# Patient Record
Sex: FEMALE | Race: WHITE | NOT HISPANIC OR LATINO | Employment: STUDENT | URBAN - METROPOLITAN AREA
[De-identification: names, ages, dates, MRNs, and addresses within clinical notes are randomized per-mention and may not be internally consistent; named-entity substitution may affect disease eponyms.]

---

## 2017-01-03 ENCOUNTER — GENERIC CONVERSION - ENCOUNTER (OUTPATIENT)
Dept: OTHER | Facility: OTHER | Age: 22
End: 2017-01-03

## 2017-01-16 ENCOUNTER — GENERIC CONVERSION - ENCOUNTER (OUTPATIENT)
Dept: OTHER | Facility: OTHER | Age: 22
End: 2017-01-16

## 2017-01-16 ENCOUNTER — ALLSCRIPTS OFFICE VISIT (OUTPATIENT)
Dept: OTHER | Facility: OTHER | Age: 22
End: 2017-01-16

## 2017-01-16 DIAGNOSIS — N64.4 MASTODYNIA: ICD-10-CM

## 2017-01-20 ENCOUNTER — APPOINTMENT (OUTPATIENT)
Dept: ULTRASOUND IMAGING | Facility: CLINIC | Age: 22
End: 2017-01-20
Payer: COMMERCIAL

## 2017-01-20 ENCOUNTER — HOSPITAL ENCOUNTER (OUTPATIENT)
Dept: ULTRASOUND IMAGING | Facility: CLINIC | Age: 22
Discharge: HOME | End: 2017-01-20
Payer: COMMERCIAL

## 2017-01-20 DIAGNOSIS — N64.4 MASTODYNIA: ICD-10-CM

## 2017-01-20 PROCEDURE — 76642 ULTRASOUND BREAST LIMITED: CPT

## 2018-01-12 NOTE — MISCELLANEOUS
Message   Date: 03 Jan 2017 2:25 PM EST, Recorded By: Francis Verde For: Ermelinda Double: Olena Luciano, Self   Phone: [phone] (Home)   Reason: Medical Complaint   Patient called c/o bilateral breast pain x a few days, more toward axilla  LMP 12/19/16  Advised schedule appointment  Active Problems    1  Breast self examination education, encounter for (V65 49) (Z71 89)   2  Contraceptive surveillance (V25 40) (Z30 40)   3  Dysmenorrhea (625 3) (N94 6)   4  Encounter for initial management of nuvaring (V25 02) (Z30 49)   5  Encounter for routine pelvic examination (V72 31) (Z01 419)   6  Folliculitis (425 3) (J64 5)   7  History of self breast exam   8  Knee pain (719 46) (M25 569)   9  Oral contraceptive use (V25 41) (Z30 41)   10  Ovarian cyst (620 2) (N83 20)   11  Pelvic and perineal pain (625 9) (R10 2)   12  Stress incontinence, female (625 6) (N39 3)    Current Meds   1  Aviane 0 1-20 MG-MCG Oral Tablet; TAKE 1 TABLET DAILY AS DIRECTED; Therapy: 45VPS8204 to (Evaluate:28Oct2016)  Requested for: 30XPC1612; Last   Rx:13Oct2016 Ordered   2  Caltrate 600+D TABS; Therapy: (Recorded:04Owv3307) to Recorded   3  Ibuprofen 400 MG Oral Tablet; Therapy: (Recorded:66Bfq9280) to Recorded   4  Multi-Vitamin Daily Oral Tablet; Therapy: (Recorded:84Mix5602) to Recorded   5  NexIUM PACK; Therapy: (Recorded:39Xcq1907) to Recorded   6  Pepcid AC TABS; Therapy: (Recorded:29Snn1848) to Recorded    Allergies    1   No Known Drug Allergies    Signatures   Electronically signed by : Horacio Schreiber, ; Jr  3 2017  2:28PM EST                       (Author)

## 2018-01-16 NOTE — MISCELLANEOUS
Message   Recorded as Task   Date: 05/18/2016 01:31 PM, Created By: Janene Fuller   Task Name: Follow Up   Assigned To: Arcelia Thompson   Regarding Patient: Mychal Winslow, Status: In Progress   Comment:    Karissa Campbell - 18 May 2016 1:31 PM     TASK CREATED  pt is on microgestin and is still having irregular cycles and is getting headaches    she was on nuva ring before and it was changed because of the headaches, but her cycle was regular then  Nataliia Seymour any ideas? Kristy Hernandez - 18 May 2016 8:48 PM     TASK REPLIED TO: Previously Assigned To Kristy Hernandez  she should probably come in for discussion   Janene Fuller - 19 May 2016 7:21 AM     TASK REPLIED TO: Previously Assigned To Karissa Campbell  the problem is that you are booking into September   Rin Mesa - 20 May 2016 2:05 PM     TASK REPLIED TO: Previously Assigned To Kristy Hernandez  have her try aviane and then RTO 3 months for pill check, if HA continue she should call,   Karissa Campbell - 23 May 2016 7:19 AM     TASK REASSIGNED: Previously Assigned To Brianna Castro - 23 May 2016 1:24 PM     TASK IN PROGRESS    Patient informed  Escript sent  Will schedule pill check appt  Active Problems    1  Breast self examination education, encounter for (V65 49) (Z71 89)   2  Contraceptive surveillance (V25 40) (Z30 40)   3  Dysmenorrhea (625 3) (N94 6)   4  Encounter for initial management of nuvaring (V25 02) (Z30 49)   5  Encounter for routine pelvic examination (V72 31) (Z01 419)   6  Folliculitis (547 6) (W54 0)   7  History of self breast exam   8  Knee pain (719 46) (M25 569)   9  Ovarian cyst (620 2) (N83 20)   10  Pelvic and perineal pain (625 9) (R10 2)   11  Stress incontinence, female (625 6) (N39 3)    Current Meds   1  Caltrate 600+D TABS; Therapy: (Recorded:24Nwo3998) to Recorded   2  Ibuprofen 400 MG Oral Tablet; Therapy: (Recorded:52Sfr7747) to Recorded   3   Microgestin FE 1/20 1-20 MG-MCG Oral Tablet; TAKE 1 TABLET DAILY; Therapy: 36EYA2359 to (Evaluate:20Jan2016)  Requested for: 34EFA9283; Last   Rx:08Jan2016 Ordered   4  Multi-Vitamin Daily Oral Tablet; Therapy: (Recorded:16Plp0884) to Recorded    Allergies    1   No Known Drug Allergies    Signatures   Electronically signed by : Dann Spurling, ; May 23 2016  4:14PM EST                       (Author)

## 2018-01-22 VITALS
BODY MASS INDEX: 20.02 KG/M2 | SYSTOLIC BLOOD PRESSURE: 100 MMHG | DIASTOLIC BLOOD PRESSURE: 72 MMHG | WEIGHT: 113 LBS | HEIGHT: 63 IN

## 2018-01-23 NOTE — PROGRESS NOTES
Assessment    1  Breast pain, left (611 71) (N64 4)   2  Breast pain, right (611 71) (N64 4)    Plan  Breast pain, left, Breast pain, right    · *US BREAST LEFT LIMITED (DIAGNOSTIC); Status:Hold For - Scheduling; Requested  ZGL:[id number];    Perform:[facility]; IDP:[id number]; Ordered; For:Breast pain, left, Breast pain, right; Ordered By:Yanni Church;   · *US BREAST RIGHT LIMITED (DIAGNOSTIC); Status:Hold For - Scheduling; Requested  JJI:[id number];    Perform:[facility]; IBI:[id number]; Ordered; For:Breast pain, left, Breast pain, right; Ordered By:Yanni Church; Discussion/Summary  Discussion Summary:   Breast pain most likely related to fibrocystic breast disease  I advised pt to take NSAIDS, wear loose fitting bras, decrease caffein intake and possibly use supplement like evening primrose oil and vitamin E   Breast US was ordered for patient reassurance, no single nodule was palpated, we will call pt with results  Counseling Documentation With Imm: The patient was counseled regarding instructions for management, prognosis, patient and family education, risks and benefits of treatment options  total time of encounter was 25 minutes and 15 minutes was spent counseling  Chief Complaint  Chief Complaint Free Text Note Form: PATIENT IS HERE FOR PROBLEM VISIT, PATIENT C/O BILATERAL BREAST PAIN      History of Present Illness  HPI: 24 y/o nulliparous F here for problem visit  She is currently on oral contraception  She has been experiencing breast pain on the outer edges of her breast for a few weeks  This has not gone away and is not related to her cycles or change in contraception  Her LMP was 12/18/16  Menarche was at the age of 17  She denies nipple discharge, breast masses  She denies a family h/o breast CA  Review of Systems  Focused-Female:   Constitutional: No fever, no chills, feels well, no tiredness, no recent weight gain or loss     ENT: no ear ache, no loss of hearing, no nosebleeds or nasal discharge, no sore throat or hoarseness  Cardiovascular: no complaints of slow or fast heart rate, no chest pain, no palpitations, no leg claudication or lower extremity edema  Respiratory: no complaints of shortness of breath, no wheezing, no dyspnea on exertion, no orthopnea or PND  Breasts: as noted in HPI  Gastrointestinal: no complaints of abdominal pain, no constipation, no nausea or diarrhea, no vomiting, no bloody stools  Genitourinary: no complaints of dysuria, no incontinence, no pelvic pain, no dysmenorrhea, no vaginal discharge or abnormal vaginal bleeding  Musculoskeletal: no complaints of arthralgia, no myalgia, no joint swelling or stiffness, no limb pain or swelling  Integumentary: no complaints of skin rash or lesion, no itching or dry skin, no skin wounds  Neurological: no complaints of headache, no confusion, no numbness or tingling, no dizziness or fainting  ROS Reviewed:   ROS reviewed  Active Problems    1  Breast self examination education, encounter for ( 49) (Z71 89)   2  Contraceptive surveillance (V25 40) (Z30 40)   3  Dysmenorrhea (625 3) (N94 6)   4  Encounter for initial management of nuvaring (V25 02) (Z30 49)   5  Encounter for routine pelvic examination (V72 31) (Z01 419)   6  Folliculitis (651 5) (R82 0)   7  History of self breast exam   8  Knee pain (719 46) (M25 569)   9  Oral contraceptive use (V25 41) (Z30 41)   10  Ovarian cyst (620 2) (N83 20)   11  Pelvic and perineal pain (625 9) (R10 2)   12  Stress incontinence, female (625 6) (N39 3)    Past Medical History    1  Breast self examination education, encounter for ( 49) (Z71 89)   2  History of Denial Of Any Significant Medical History   3  History of  0 (V49 89) (Z78 9)   4  History of self breast exam  Active Problems And Past Medical History Reviewed: The active problems and past medical history were reviewed and updated today        Surgical History 1  History of Knee Surgery   2  History of Oral Surgery Tooth Extraction  Surgical History Reviewed: The surgical history was reviewed and updated today  Family History  Mother    1  No pertinent family history  Father    2  Family history of diabetes mellitus (V18 0) (Z83 3)  Paternal [de-identified] Sister    3  Family history of diabetes mellitus (V18 0) (Z83 3)  Paternal Great Grandmother    4  Family history of diabetes mellitus (V18 0) (Z83 3)  Paternal Uncle    5  Family history of diabetes mellitus (V18 0) (Z83 3)   6  Family history of hepatic cirrhosis (V18 59) (Z83 79)  Family History    7  Family history of Diabetes Mellitus (V18 0)  Family History Reviewed: The family history was reviewed and updated today  Social History    · Denied: History of Alcohol Use (History)   · Caffeine Use   · Condom   · Denied: History of Drug use   · Denied: History of    · Marital History - Single   · Never A Smoker   · Oral contraceptive use (V25 41) (Z30 41)   · Religion Affiliation None   · Uses Safety Equipment - Seatbelts  Social History Reviewed: The social history was reviewed and updated today  The social history was reviewed and is unchanged  Current Meds   1  Aviane 0 1-20 MG-MCG Oral Tablet; TAKE 1 TABLET DAILY AS DIRECTED; Therapy:  to (Evaluate:2016)  Requested for: 96QNM7213; Last   Rx:2016 Ordered   2  Caltrate 600+D TABS; Therapy: (Recorded:69Ggl2603) to Recorded   3  Ibuprofen 400 MG Oral Tablet; Therapy: (Recorded:19Gkr5468) to Recorded   4  Multi-Vitamin Daily Oral Tablet; Therapy: (Recorded:74Ndr9765) to Recorded   5  NexIUM PACK; Therapy: (Recorded:92Yeb4735) to Recorded   6  Pepcid AC TABS; Therapy: (Recorded:93Joi9306) to Recorded    Allergies    1   No Known Drug Allergies    Vitals  Vital Signs    Recorded: 80PDT6612 42:29KM   Systolic 014, RUE, Sitting   Diastolic 72, RUE, Sitting   Height 5 ft 2 5 in   Weight 113 lb    BMI Calculated 20 34   BSA Calculated 1 51   BMI Percentile 33 %   2-20 Stature Percentile 24 %   2-20 Weight Percentile 21 %   LMP 54XAP2317     Physical Exam    Chest   Breasts: Abnormal   normal appearance  Examination of the nipples revealed normal appearance and no discharge  Right Breast:  fibrocystic changes  A mass was palpated in the superior medial quadrant  Left Breast: mulitple nodules were palpated   fibrocystic changes  A mass was palpated in the superior lateral quadrant         Signatures   Electronically signed by : LAURO Santos ; Jan 16 2017  2:13PM EST                       (Author)

## 2018-01-30 DIAGNOSIS — Z30.41 ENCOUNTER FOR SURVEILLANCE OF CONTRACEPTIVE PILLS: Primary | ICD-10-CM

## 2018-02-02 ENCOUNTER — TELEPHONE (OUTPATIENT)
Dept: OBGYN CLINIC | Facility: CLINIC | Age: 23
End: 2018-02-02

## 2018-02-02 DIAGNOSIS — Z30.41 ENCOUNTER FOR SURVEILLANCE OF CONTRACEPTIVE PILLS: Primary | ICD-10-CM

## 2018-02-02 RX ORDER — LEVONORGESTREL AND ETHINYL ESTRADIOL 0.1-0.02MG
1 KIT ORAL DAILY
Qty: 28 TABLET | Refills: 2 | Status: SHIPPED | OUTPATIENT
Start: 2018-02-02 | End: 2018-07-11

## 2018-02-09 RX ORDER — IBUPROFEN 400 MG/1
TABLET ORAL ONCE AS NEEDED
COMMUNITY

## 2018-02-09 RX ORDER — LEVONORGESTREL AND ETHINYL ESTRADIOL 0.1-0.02MG
KIT ORAL
Qty: 28 TABLET | Refills: 5 | Status: SHIPPED | OUTPATIENT
Start: 2018-02-09 | End: 2018-07-11 | Stop reason: SDUPTHER

## 2018-02-09 RX ORDER — FAMOTIDINE 10 MG
TABLET ORAL
COMMUNITY
End: 2018-07-11

## 2018-05-15 ENCOUNTER — EVALUATION (OUTPATIENT)
Dept: PHYSICAL THERAPY | Facility: MEDICAL CENTER | Age: 23
End: 2018-05-15
Payer: COMMERCIAL

## 2018-05-15 DIAGNOSIS — M76.891 HIP FLEXOR TENDONITIS, RIGHT: Primary | ICD-10-CM

## 2018-05-15 DIAGNOSIS — M53.3 SACROILIAC DYSFUNCTION: ICD-10-CM

## 2018-05-15 PROCEDURE — G8990 OTHER PT/OT CURRENT STATUS: HCPCS | Performed by: PHYSICAL THERAPIST

## 2018-05-15 PROCEDURE — 97162 PT EVAL MOD COMPLEX 30 MIN: CPT | Performed by: PHYSICAL THERAPIST

## 2018-05-15 PROCEDURE — G8991 OTHER PT/OT GOAL STATUS: HCPCS | Performed by: PHYSICAL THERAPIST

## 2018-05-15 NOTE — PROGRESS NOTES
Evaluation     Today's date: 5/15/2018  Patient name: Pastor Valerio  : 1995  MRN: [id number]  Referring provider: Renuka Lisa  Dx:   Encounter Diagnosis     ICD-10-CM    1  Hip flexor tendonitis, right M76 891    2  Sacroiliac dysfunction M53 3                   Assessment  Impairments: abnormal coordination, abnormal muscle firing, abnormal muscle tone, abnormal or restricted ROM, abnormal movement, activity intolerance, impaired physical strength, lacks appropriate home exercise program and pain with function    Assessment details: Arturo Oscar is a pleasant 24 y/o female who presents with complaints of right posterior and anterior hip pain with difficulty performing gymnastics and higher level activities  Patient notes that her pain is worse at times when she is active, laughing, coughing etc  Patient's symptoms are consistent with SIJ upslip and a right hip flexor tendonitis   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  She has been doing better overall since having an injection in her anterior hip  No further referral appears necessary at this time based upon examination results      Understanding of Dx/Px/POC: good   Prognosis: good  Prognosis details: Positive prognostic indicators include positive attitude toward recovery        Goals  Impairment Goals  - Pt I with initial HEP in 1-2 visits  - Improve ROM equal to contralateral side in 4-6 weeks  - Increase strength to 5/5 in all affected areas in 4-6 week    Functional Goals  - Increase Functional Status Measure measured by FOTO by [facility]  - Patient will be independent with comprehensive HEP in 6-8 weeks  - Return to ADL's as desired     Plan  Patient would benefit from: skilled PT  Referral necessary: No  Planned modality interventions: thermotherapy: hydrocollator packs  Planned therapy interventions: abdominal trunk stabilization, activity modification, joint mobilization, manual therapy, motor coordination training, neuromuscular re-education, patient education, self care, therapeutic activities, therapeutic exercise, home exercise program, behavior modification, postural training, transfer training, IADL retraining and graded activity  Treatment plan discussed with: patient  Plan details: Prognosis above is given PT services 2x/week tapering to 1x/week over the next 2 months and home program adherence          Subjective Evaluation     History of Present Illness  Mechanism of injury: Patient states that she doesn't know when her pain started however she has been having severe anterior hip pain over the past several weeks to the point where she couldn't walk  Patient notes that her hip has been feeling better since injection but her lower back and posterior hip pain persists  Patient states that her back pain is worse with increased activity and 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  Pain  Current pain rating: 3  At best pain ratin  At worst pain ratin  Quality: throbbing     Treatments  Previous treatment: chiropractic         Objective      Special Questions        Additional Special Questions  No red flags present  Lower quarter screen unremarkable     Static Posture       Postural Observations  Seated posture: normal  Standing posture: normal        Palpation   Left   Hypertonic in the erector spinae and quadratus lumborum  Hypotonic in the transverse abdominus       Right   Hypertonic in the erector spinae and quadratus lumborum  Hypotonic in the transverse abdominus     Pain at anterior hip flexor and surrounding ASIS  Discomfort at PSIS      Neurological Testing      Sensation      Normal   No signs of radiculopathy or neuropathology     Reflexes   Left   Patellar (L4): normal (2+)  Achilles (S1): normal (2+)  Babinski sign: negative  Clonus sign: negative     Right   Patellar (L4): normal (2+)  Achilles (S1): normal (2+)  Babinski sign: negative  Clonus sign: negative     Active Range of Motion      Lumbar   Flexion: WFL and with pain  Extension: WFL and with pain  Left lateral flexion: WFL  Right lateral flexion: WFL  Left rotation: WFL  Right rotation: WFL  Left Knee   Normal active range of motion     Right Knee   Normal active range of motion    Left Ankle/Foot   Normal active range of motion     Right Ankle/Foot   Normal active range of motion     Passive Range of Motion   Left Hip   Internal rotation (prone): 50 degrees      Right Hip   Internal rotation (prone): 50 degrees      Strength/Myotome Testing      Lumbar   Left   Heel walk: normal  Toe walk: normal     Right   Heel walk: normal  Toe walk: normal     right Hip   Planes of Motion   Flexion: WFL  Extension: 3+  Abduction: 3+  Adduction: WFL  External rotation: 3+     left Hip   Planes of Motion   Flexion: WFL  Extension: 5  Abduction: 5  Adduction: WFL  External rotation:5     Left Knee   Flexion: WFL  Extension: WFL     Right Knee   Flexion: WFL  Extension: WFL     Left Ankle/Foot   Dorsiflexion: WFL  Plantar flexion: WFL  Inversion: WFL  Eversion: WFL  Great toe extension: WFL     Right Ankle/Foot   Dorsiflexion: WFL  Plantar flexion: WFL  Inversion: WFL  Eversion: WFL  Great toe extension: WFL     Muscle Activation     Difficulty with activation of right hip flexors due to pain   Patient demonstrates poor activation of Transversus Abdominus and multifidus force couple and loss of feed forward mechanism with reaching tasks    Patient was able to perform activation with cueing        Tests      Lumbar   Positive prone instability   Negative repeated flexion and repeated extension       Left   Negative crossed SLR, [place] and passive SLR       Right   Negative crossed SLR, [place] and passive SLR        Left Pelvic Girdle/Sacrum   Negative: sacrum compression, sacral spring and thigh thrust       Right Pelvic Girdle/Sacrum   Positive:  sacrum compression, sacral spring and thigh thrust, SI compression and SI distraction       Left Hip   Negative DENNY PINEDO Gaenslen's, samson, SI compression and SI distraction     SLR: Negative       Right Hip   Negative DENNY PINEDO Gaenslen's, scour,  SLR: Negative       Ambulation   Weight-Bearing Status   Weight-Bearing Status (Left): full weight bearing   Weight-Bearing Status (Right): full weight-bearing       Observational Gait   Gait: within functional limits      Functional Assessment   Squat   Pain       Single Leg Stance   Left: 10 (EO & EC) seconds  Right: 10 (EO & EC) seconds        Precautions: none

## 2018-05-15 NOTE — LETTER
May 16, 2018    Dyllan Ramirez MD  [address] [place] [id number]    Patient: Romina Shearer   YOB: 1995   Date of Visit: 5/15/2018     Encounter Diagnosis     ICD-10-CM    1  Hip flexor tendonitis, right M76 891    2  Sacroiliac dysfunction M53 3        Dear Dr Margaux Holm:    Please review the attached Plan of Care from [facility] recent visit  Please verify that you agree therapy should continue by signing the attached document and sending it back to our office  If you have any questions or concerns, please don't hesitate to call  Sincerely,    Joseph Bartlett PT      Referring Provider:      I certify that I have read the below Plan of Care and certify the need for these services furnished under this plan of treatment while under my care  Dyllan Ramirez MD  [address] [place] [address]: [phone]          Evaluation     Today's date: 5/15/2018  Patient name: Romina Shearer  : 1995  MRN: [id number]  Referring provider: Adelfo London*  Dx:   Encounter Diagnosis     ICD-10-CM    1  Hip flexor tendonitis, right M76 891    2  Sacroiliac dysfunction M53 3                   Assessment  Impairments: abnormal coordination, abnormal muscle firing, abnormal muscle tone, abnormal or restricted ROM, abnormal movement, activity intolerance, impaired physical strength, lacks appropriate home exercise program and pain with function    Assessment details: Ean Patterson is a pleasant 22 y/o female who presents with complaints of right posterior and anterior hip pain with difficulty performing gymnastics and higher level activities  Patient notes that her pain is worse at times when she is active, laughing, coughing etc  Patient's symptoms are consistent with SIJ upslip and a right hip flexor tendonitis   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    She has been doing better overall since having an injection in her anterior hip  No further referral appears necessary at this time based upon examination results  Understanding of Dx/Px/POC: good   Prognosis: good  Prognosis details: Positive prognostic indicators include positive attitude toward recovery        Goals  Impairment Goals  - Pt I with initial HEP in 1-2 visits  - Improve ROM equal to contralateral side in 4-6 weeks  - Increase strength to 5/5 in all affected areas in 4-6 week    Functional Goals  - Increase Functional Status Measure measured by TO by [facility]  - Patient will be independent with comprehensive HEP in 6-8 weeks  - Return to ADL's as desired     Plan  Patient would benefit from: skilled PT  Referral necessary: No  Planned modality interventions: thermotherapy: hydrocollator packs  Planned therapy interventions: abdominal trunk stabilization, activity modification, joint mobilization, manual therapy, motor coordination training, neuromuscular re-education, patient education, self care, therapeutic activities, therapeutic exercise, home exercise program, behavior modification, postural training, transfer training, IADL retraining and graded activity  Treatment plan discussed with: patient  Plan details: Prognosis above is given PT services 2x/week tapering to 1x/week over the next 2 months and home program adherence          Subjective Evaluation     History of Present Illness  Mechanism of injury: Patient states that she doesn't know when her pain started however she has been having severe anterior hip pain over the past several weeks to the point where she couldn't walk  Patient notes that her hip has been feeling better since injection but her lower back and posterior hip pain persists  Patient states that her back pain is worse with increased activity and 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       Pain  Current pain rating: 3  At best pain ratin  At worst pain ratin  Quality: throbbing     Treatments  Previous treatment: chiropractic         Objective      Special Questions        Additional Special Questions  No red flags present  Lower quarter screen unremarkable     Static Posture       Postural Observations  Seated posture: normal  Standing posture: normal        Palpation   Left   Hypertonic in the erector spinae and quadratus lumborum  Hypotonic in the transverse abdominus       Right   Hypertonic in the erector spinae and quadratus lumborum  Hypotonic in the transverse abdominus     Pain at anterior hip flexor and surrounding ASIS  Discomfort at PSIS      Neurological Testing      Sensation      Normal   No signs of radiculopathy or neuropathology     Reflexes   Left   Patellar (L4): normal (2+)  Achilles (S1): normal (2+)  Babinski sign: negative  Clonus sign: negative     Right   Patellar (L4): normal (2+)  Achilles (S1): normal (2+)  Babinski sign: negative  Clonus sign: negative     Active Range of Motion      Lumbar   Flexion: WFL and with pain  Extension: WFL and with pain  Left lateral flexion: WFL  Right lateral flexion: WFL  Left rotation: WFL  Right rotation: WFL  Left Knee   Normal active range of motion     Right Knee   Normal active range of motion    Left Ankle/Foot   Normal active range of motion     Right Ankle/Foot   Normal active range of motion     Passive Range of Motion   Left Hip   Internal rotation (prone): 50 degrees      Right Hip   Internal rotation (prone): 50 degrees      Strength/Myotome Testing      Lumbar   Left   Heel walk: normal  Toe walk: normal     Right   Heel walk: normal  Toe walk: normal     right Hip   Planes of Motion   Flexion: WFL  Extension: 3+  Abduction: 3+  Adduction: WFL  External rotation: 3+     left Hip   Planes of Motion   Flexion: WFL  Extension: 5  Abduction: 5  Adduction: WFL  External rotation:5     Left Knee   Flexion: WFL  Extension: WFL     Right Knee   Flexion: WFL  Extension: WFL     Left Ankle/Foot   Dorsiflexion: WFL  Plantar flexion: WFL  Inversion: WFL  Eversion: WFL  Great toe extension: WFL     Right Ankle/Foot   Dorsiflexion: WFL  Plantar flexion: WFL  Inversion: WFL  Eversion: WFL  Great toe extension: WFL     Muscle Activation     Difficulty with activation of right hip flexors due to pain   Patient demonstrates poor activation of Transversus Abdominus and multifidus force couple and loss of feed forward mechanism with reaching tasks    Patient was able to perform activation with cueing        Tests      Lumbar   Positive prone instability   Negative repeated flexion and repeated extension       Left   Negative crossed SLR, [place] and passive SLR       Right   Negative crossed SLR, [place] and passive SLR     Left Pelvic Girdle/Sacrum   Negative: sacrum compression, sacral spring and thigh thrust       Right Pelvic Girdle/Sacrum   Positive:  sacrum compression, sacral spring and thigh thrust, SI compression and SI distraction       Left Hip   Negative KHRIS, FADIR, Gaenslen's, scour, SI compression and SI distraction     SLR: Negative       Right Hip   Negative KHRIS, FADIR, Gaenslen's, scour,  SLR: Negative       Ambulation   Weight-Bearing Status   Weight-Bearing Status (Left): full weight bearing   Weight-Bearing Status (Right): full weight-bearing       Observational Gait   Gait: within functional limits      Functional Assessment   Squat   Pain       Single Leg Stance   Left: 10 (EO & EC) seconds  Right: 10 (EO & EC) seconds        Precautions: none

## 2018-05-16 ENCOUNTER — TRANSCRIBE ORDERS (OUTPATIENT)
Dept: PHYSICAL THERAPY | Facility: MEDICAL CENTER | Age: 23
End: 2018-05-16

## 2018-05-16 DIAGNOSIS — M76.891 HIP FLEXOR TENDONITIS, RIGHT: Primary | ICD-10-CM

## 2018-05-18 ENCOUNTER — OFFICE VISIT (OUTPATIENT)
Dept: PHYSICAL THERAPY | Facility: MEDICAL CENTER | Age: 23
End: 2018-05-18
Payer: COMMERCIAL

## 2018-05-18 DIAGNOSIS — M53.3 SACROILIAC DYSFUNCTION: Primary | ICD-10-CM

## 2018-05-18 PROCEDURE — 97110 THERAPEUTIC EXERCISES: CPT | Performed by: PHYSICAL THERAPIST

## 2018-05-18 PROCEDURE — 97140 MANUAL THERAPY 1/> REGIONS: CPT | Performed by: PHYSICAL THERAPIST

## 2018-05-18 PROCEDURE — 97010 HOT OR COLD PACKS THERAPY: CPT | Performed by: PHYSICAL THERAPIST

## 2018-05-18 NOTE — PROGRESS NOTES
Daily Note     Today's date: 2018  Patient name: Parker Casiano  : 1995  MRN: [id number]  Referring provider: Graeme Elder*  Dx:   Encounter Diagnosis     ICD-10-CM    1  Sacroiliac dysfunction M53 3                   Subjective: patient states that she is feeling ok today having more pain following last session but better following that  Patient would like to return to activity as desired  Objective: See treatment diary below  Precautions: none    Daily Treatment Diary     Manual              Lumbar supine mob Gr  v            Long leg distraction 2min                                                       Exercise Diary              bike 10min            Hip flexor stretch 10x3            Quad stretch 10x3            Side lying hip abduction 10x3            Bridge with ball 10x3            clamshell 10x3            Standing hip extension 10x3                                                                                                                                                                                         Modalities                                                               Assessment: Tolerated treatment well  Patient demonstrated fatigue post treatment, exhibited good technique with therapeutic exercises and would benefit from continued PT      Plan: Continue per plan of care

## 2018-05-22 ENCOUNTER — OFFICE VISIT (OUTPATIENT)
Dept: PHYSICAL THERAPY | Facility: MEDICAL CENTER | Age: 23
End: 2018-05-22
Payer: COMMERCIAL

## 2018-05-22 DIAGNOSIS — M76.891 HIP FLEXOR TENDONITIS, RIGHT: ICD-10-CM

## 2018-05-22 DIAGNOSIS — M53.3 SACROILIAC DYSFUNCTION: Primary | ICD-10-CM

## 2018-05-22 PROCEDURE — 97140 MANUAL THERAPY 1/> REGIONS: CPT | Performed by: PHYSICAL THERAPIST

## 2018-05-22 PROCEDURE — 97112 NEUROMUSCULAR REEDUCATION: CPT | Performed by: PHYSICAL THERAPIST

## 2018-05-22 PROCEDURE — 97110 THERAPEUTIC EXERCISES: CPT | Performed by: PHYSICAL THERAPIST

## 2018-05-22 NOTE — PROGRESS NOTES
Daily Note     Today's date: 2018  Patient name: Silviano Hammonds  : 1995  MRN: [id number]  Referring provider: Valarie Fink  Dx:   Encounter Diagnosis     ICD-10-CM    1  Sacroiliac dysfunction M53 3    2  Hip flexor tendonitis, right M76 891                   Subjective: patient states that she is feeling ok today having a lot of cracking and popping in the front of her hip  Objective: See treatment diary below  Precautions: none    Daily Treatment Diary     Manual              Lumbar supine mob Gr  v            Long leg distraction 2min                                                       Exercise Diary              bike 10min            Hip flexor stretch 10x3            Quad stretch 10x3            Side lying hip abduction 10x3            Bridge with ball 10x3            clamshell 10x3            Standing hip extension 10x3                                                                                                                                                                                         Modalities                                                               Assessment: Tolerated treatment well  Patient demonstrated fatigue post treatment, exhibited good technique with therapeutic exercises and would benefit from continued PT  Patient was progressed with TE as tolerated without increasing her symptoms  Plan: Continue per plan of care

## 2018-05-25 ENCOUNTER — APPOINTMENT (OUTPATIENT)
Dept: PHYSICAL THERAPY | Facility: MEDICAL CENTER | Age: 23
End: 2018-05-25
Payer: COMMERCIAL

## 2018-05-29 ENCOUNTER — OFFICE VISIT (OUTPATIENT)
Dept: PHYSICAL THERAPY | Facility: MEDICAL CENTER | Age: 23
End: 2018-05-29
Payer: COMMERCIAL

## 2018-05-29 DIAGNOSIS — M76.891 HIP FLEXOR TENDONITIS, RIGHT: ICD-10-CM

## 2018-05-29 DIAGNOSIS — M53.3 SACROILIAC DYSFUNCTION: Primary | ICD-10-CM

## 2018-05-29 PROCEDURE — 97110 THERAPEUTIC EXERCISES: CPT | Performed by: PHYSICAL THERAPIST

## 2018-05-29 PROCEDURE — 97140 MANUAL THERAPY 1/> REGIONS: CPT | Performed by: PHYSICAL THERAPIST

## 2018-05-29 NOTE — PROGRESS NOTES
Daily Note     Today's date: 2018  Patient name: Tramaine Morelos  : 1995  MRN: [id number]  Referring provider: Roya Chavarria  Dx:   Encounter Diagnosis     ICD-10-CM    1  Sacroiliac dysfunction M53 3    2  Hip flexor tendonitis, right M76 891                   Subjective: patient states that she is feeling good having less pain overall  Hip is feeling better however continues to crack  Lower back is painful going from flexion to neutral        Objective: See treatment diary below  Precautions: none    Daily Treatment Diary     Manual              Lumbar supine mob Gr  v            Long leg distraction 2min                                                       Exercise Diary              bike 10min            Hip flexor stretch off table 10x3            Quad stretch 10x3            Side lying hip abduction 10x3            Bridge with ball 10x3            clamshell 10x3            Standing hip extension 10x3            Piriformis stretch 10x3                                                                                                                                                                            Modalities                                                               Assessment: Tolerated treatment well  Patient demonstrated fatigue post treatment, exhibited good technique with therapeutic exercises and would benefit from continued PT  Patient was progressed with TE as tolerated without increasing her symptoms  Patient was kinesio taped at the end of session  Plan: Continue per plan of care

## 2018-06-01 ENCOUNTER — OFFICE VISIT (OUTPATIENT)
Dept: PHYSICAL THERAPY | Facility: MEDICAL CENTER | Age: 23
End: 2018-06-01
Payer: COMMERCIAL

## 2018-06-01 DIAGNOSIS — M53.3 SACROILIAC DYSFUNCTION: Primary | ICD-10-CM

## 2018-06-01 DIAGNOSIS — M76.891 HIP FLEXOR TENDONITIS, RIGHT: ICD-10-CM

## 2018-06-01 PROCEDURE — 97140 MANUAL THERAPY 1/> REGIONS: CPT | Performed by: PHYSICAL THERAPIST

## 2018-06-01 PROCEDURE — 97110 THERAPEUTIC EXERCISES: CPT | Performed by: PHYSICAL THERAPIST

## 2018-06-01 NOTE — PROGRESS NOTES
Daily Note     Today's date: 2018  Patient name: Tyler Ayala  : 1995  MRN: [id number]  Referring provider: Atilio Lyman  Dx:   Encounter Diagnosis     ICD-10-CM    1  Sacroiliac dysfunction M53 3    2  Hip flexor tendonitis, right M76 891                   Subjective: patient states that she is feeling good having less pain overall  Hip is feeling better however continues to crack  Lower back is painful going from flexion to neutral        Objective: See treatment diary below  Precautions: none    Daily Treatment Diary     Manual              Lumbar supine mob Gr  v            Long leg distraction 2min                                                       Exercise Diary              bike 10min            Hip flexor stretch off table 10x3            Quad stretch 10x3            Side lying hip abduction 10x3            Bridge with ball 10x3            clamshell 10x3            Standing hip extension 10x3            Piriformis stretch 10x3            Modified plank [id number]                                                                                                                                                               Modalities                                                               Assessment: Tolerated treatment well  Patient demonstrated fatigue post treatment, exhibited good technique with therapeutic exercises and would benefit from continued PT  Patient was progressed with TE as tolerated without increasing her symptoms  Patient was kinesio taped at the end of session  Plan: Continue per plan of care

## 2018-06-04 ENCOUNTER — OFFICE VISIT (OUTPATIENT)
Dept: PHYSICAL THERAPY | Facility: MEDICAL CENTER | Age: 23
End: 2018-06-04
Payer: COMMERCIAL

## 2018-06-04 DIAGNOSIS — M76.891 HIP FLEXOR TENDONITIS, RIGHT: ICD-10-CM

## 2018-06-04 DIAGNOSIS — M53.3 SACROILIAC DYSFUNCTION: Primary | ICD-10-CM

## 2018-06-04 PROCEDURE — 97140 MANUAL THERAPY 1/> REGIONS: CPT | Performed by: PHYSICAL THERAPIST

## 2018-06-04 PROCEDURE — 97110 THERAPEUTIC EXERCISES: CPT | Performed by: PHYSICAL THERAPIST

## 2018-06-04 NOTE — PROGRESS NOTES
Daily Note     Today's date: 2018  Patient name: Panfilo Goldberg  : 1995  MRN: [id number]  Referring provider: Gardenia Sunshine*  Dx:   Encounter Diagnosis     ICD-10-CM    1  Sacroiliac dysfunction M53 3    2  Hip flexor tendonitis, right M76 891                   Subjective: patient states that she is feeling good having less pain overall  Hip is feeling better however continues to crack  Lower back is painful going from flexion to neutral        Objective: See treatment diary below  Precautions: none    Daily Treatment Diary     Manual              Lumbar supine mob Gr  v            Long leg distraction 2min                                                       Exercise Diary              bike 10min            Hip flexor stretch off table 10x3            Quad stretch 10x3            Side lying hip abduction 10x3            Bridge with ball 10x3            clamshell 10x3            Standing hip extension 10x3            Piriformis stretch 10x3            Modified plank [id number]                                                                                                                                                               Modalities                                                               Assessment: Tolerated treatment well  Patient demonstrated fatigue post treatment, exhibited good technique with therapeutic exercises and would benefit from continued PT  Patient was progressed with TE as tolerated without increasing her symptoms  Patient was kinesio taped at the end of session  Plan: Continue per plan of care

## 2018-06-08 ENCOUNTER — APPOINTMENT (OUTPATIENT)
Dept: PHYSICAL THERAPY | Facility: MEDICAL CENTER | Age: 23
End: 2018-06-08
Payer: COMMERCIAL

## 2018-06-11 ENCOUNTER — APPOINTMENT (OUTPATIENT)
Dept: PHYSICAL THERAPY | Facility: MEDICAL CENTER | Age: 23
End: 2018-06-11
Payer: COMMERCIAL

## 2018-06-15 ENCOUNTER — OFFICE VISIT (OUTPATIENT)
Dept: PHYSICAL THERAPY | Facility: MEDICAL CENTER | Age: 23
End: 2018-06-15
Payer: COMMERCIAL

## 2018-06-15 DIAGNOSIS — M76.891 HIP FLEXOR TENDONITIS, RIGHT: ICD-10-CM

## 2018-06-15 DIAGNOSIS — M53.3 SACROILIAC DYSFUNCTION: Primary | ICD-10-CM

## 2018-06-15 PROCEDURE — 97110 THERAPEUTIC EXERCISES: CPT | Performed by: PHYSICAL THERAPIST

## 2018-06-15 NOTE — PROGRESS NOTES
Daily Note     Today's date: 6/15/2018  Patient name: Mary Massey  : 1995  MRN: [id number]  Referring provider: Samreen Kaplan  Dx:   Encounter Diagnosis     ICD-10-CM    1  Sacroiliac dysfunction M53 3    2  Hip flexor tendonitis, right M76 891                   Subjective: patient states that she is feeling good having less pain overall  Patient notes that she is having the same level of lower back pain but the hip is much better  Objective: See treatment diary below  Precautions: none    Daily Treatment Diary     Manual  6/15            Lumbar supine mob Gr  v            Long leg distraction 2min                                                       Exercise Diary              bike 10min            Hip flexor stretch off table 10x3            Quad stretch 10x3            Side lying hip abduction 10x3            Bridge with ball 10x3            clamshell 10x3            Standing hip extension 10x3            Piriformis stretch 10x3            Modified plank [id number]            Standing shoulder extension single leg 15x2            Horizontal abduction single leg stance 15x2                                                                                                                                     Modalities                                                               Assessment: Tolerated treatment well  Patient demonstrated fatigue post treatment, exhibited good technique with therapeutic exercises and would benefit from continued PT  Patient was progressed with TE as tolerated without increasing her symptoms  Plan: Continue per plan of care

## 2018-06-20 ENCOUNTER — OFFICE VISIT (OUTPATIENT)
Dept: PHYSICAL THERAPY | Facility: MEDICAL CENTER | Age: 23
End: 2018-06-20
Payer: COMMERCIAL

## 2018-06-20 DIAGNOSIS — M53.3 SACROILIAC DYSFUNCTION: Primary | ICD-10-CM

## 2018-06-20 DIAGNOSIS — M76.891 HIP FLEXOR TENDONITIS, RIGHT: ICD-10-CM

## 2018-06-20 PROCEDURE — 97112 NEUROMUSCULAR REEDUCATION: CPT

## 2018-06-20 PROCEDURE — 97110 THERAPEUTIC EXERCISES: CPT

## 2018-06-20 NOTE — PROGRESS NOTES
Daily Note     Today's date: 2018  Patient name: Valarie Chao  : 1995  MRN: [id number]  Referring provider: Padma Carrington  Dx:   Encounter Diagnosis     ICD-10-CM    1  Sacroiliac dysfunction M53 3    2  Hip flexor tendonitis, right M76 891                   Subjective: Pt reports that although she has seen an improvement since beginning PT, she continues w/LBP when bending forward or when seated for a prolonged period of time  Pt also states that she has sleep disturbances due to LBP  Objective: See treatment diary below    Precautions: none    Daily Treatment Diary     Manual  6/15 6/20           Lumbar supine mob Gr  v            Long leg distraction 2min KO                                                      Exercise Diary              bike 10min 10 min           Hip flexor stretch off table 10x3 10x3           Quad stretch 10x3 10x3           Side lying hip abduction 10x3 Orng  10x3           Bridge with ball 10x3 np           clamshell 10x3 10x3           Standing hip extension 10x3 Orng  10x3           Piriformis stretch 10x3 10x3           Modified plank [id number] 30sec  x3           Standing shoulder extension single leg 15x2 15x2  Blk TB           Horizontal abduction single leg stance 15x2 15x2                                                                                                                                    Modalities                                                             Assessment: Tolerated treatment well  Patient demonstrated fatigue post treatment, exhibited good technique with therapeutic exercises and would benefit from continued PT  Pt states that she will make an appointment with Dr Bethany Adrian as per PT's recommendation  Plan: Continue per plan of care

## 2018-06-28 ENCOUNTER — OFFICE VISIT (OUTPATIENT)
Dept: PHYSICAL THERAPY | Facility: MEDICAL CENTER | Age: 23
End: 2018-06-28
Payer: COMMERCIAL

## 2018-06-28 DIAGNOSIS — M76.891 HIP FLEXOR TENDONITIS, RIGHT: ICD-10-CM

## 2018-06-28 DIAGNOSIS — M53.3 SACROILIAC DYSFUNCTION: Primary | ICD-10-CM

## 2018-06-28 PROCEDURE — 97110 THERAPEUTIC EXERCISES: CPT | Performed by: PHYSICAL THERAPIST

## 2018-06-28 PROCEDURE — 97140 MANUAL THERAPY 1/> REGIONS: CPT | Performed by: PHYSICAL THERAPIST

## 2018-06-28 NOTE — PROGRESS NOTES
Daily Note     Today's date: 2018  Patient name: Panfilo Goldberg  : 1995  MRN: [id number]  Referring provider: Masood Pizano  Dx:   Encounter Diagnosis     ICD-10-CM    1  Sacroiliac dysfunction M53 3    2  Hip flexor tendonitis, right M76 891                   Subjective: Pt reports that although she has seen an improvement since beginning PT, she continues w/LBP when bending forward or when seated for a prolonged period of time  Pt also states that she has sleep disturbances due to LBP  Objective: See treatment diary below    Precautions: none    Daily Treatment Diary     Manual              Lumbar supine mob Gr  v            Long leg distraction 2min                                                       Exercise Diary              bike 10min            Hip flexor stretch off table 10x3            Quad stretch 10x3            Side lying hip abduction 10x3            Bridge with ball 10x3            clamshell 10x3            Standing hip extension 10x3            Piriformis stretch 10x3            Modified plank [id number]            Standing shoulder extension single leg 15x2            Horizontal abduction single leg stance 15x2            Stability chest press 15x3  Black band                                                                                                                        Modalities                                                             Assessment: Tolerated treatment well  Patient demonstrated fatigue post treatment, exhibited good technique with therapeutic exercises and would benefit from continued PT  Pt states that she will make an appointment with Dr Olivia Pang as per PT's recommendation  Plan: Continue per plan of care

## 2018-07-03 ENCOUNTER — OFFICE VISIT (OUTPATIENT)
Dept: PHYSICAL THERAPY | Facility: MEDICAL CENTER | Age: 23
End: 2018-07-03
Payer: COMMERCIAL

## 2018-07-03 DIAGNOSIS — M53.3 SACROILIAC DYSFUNCTION: Primary | ICD-10-CM

## 2018-07-03 DIAGNOSIS — M76.891 HIP FLEXOR TENDONITIS, RIGHT: ICD-10-CM

## 2018-07-03 PROCEDURE — 97110 THERAPEUTIC EXERCISES: CPT | Performed by: PHYSICAL THERAPIST

## 2018-07-03 PROCEDURE — 97010 HOT OR COLD PACKS THERAPY: CPT | Performed by: PHYSICAL THERAPIST

## 2018-07-03 NOTE — PROGRESS NOTES
Daily Note     Today's date: 7/3/2018  Patient name: Kervin Alfonso  : 1995  MRN: [id number]  Referring provider: Hardy Oquendo*  Dx:   Encounter Diagnosis     ICD-10-CM    1  Sacroiliac dysfunction M53 3    2  Hip flexor tendonitis, right M76 891                   Subjective: Pt reports that her hip is pain free at this point however her lower back is worse and she is having much more discomfort in the musculature  Patient will be seeing Dr Svitlana Javier in the next 2 weeks  Objective: See treatment diary below    Precautions: none    Daily Treatment Diary     Manual  7/3            Lumbar supine mob Gr  v            Long leg distraction 2min                                                       Exercise Diary              bike 10min            Hip flexor stretch off table 10x3            Quad stretch 10x3            Side lying hip abduction 10x3            Bridge with ball 10x3            clamshell 10x3            Standing hip extension 10x3            Piriformis stretch 10x3            Modified plank [id number]            Standing shoulder extension single leg 15x2            Horizontal abduction single leg stance 15x2            Stability chest press 15x3  Black band                                                                                                                        Modalities                                                             Assessment: Patient will be placed on hold until she sees Dr Svitlana Javier  Patient's symptoms seem to be much more soft tissue at this point  Hip issues have resolved  Plan: Continue per plan of care

## 2018-07-11 ENCOUNTER — ANNUAL EXAM (OUTPATIENT)
Dept: OBGYN CLINIC | Facility: CLINIC | Age: 23
End: 2018-07-11
Payer: COMMERCIAL

## 2018-07-11 VITALS
DIASTOLIC BLOOD PRESSURE: 76 MMHG | SYSTOLIC BLOOD PRESSURE: 106 MMHG | WEIGHT: 127.4 LBS | BODY MASS INDEX: 21.75 KG/M2 | HEIGHT: 64 IN

## 2018-07-11 DIAGNOSIS — Z30.41 ENCOUNTER FOR SURVEILLANCE OF CONTRACEPTIVE PILLS: Primary | ICD-10-CM

## 2018-07-11 PROCEDURE — 99213 OFFICE O/P EST LOW 20 MIN: CPT | Performed by: OBSTETRICS & GYNECOLOGY

## 2018-07-11 PROCEDURE — G0145 SCR C/V CYTO,THINLAYER,RESCR: HCPCS | Performed by: OBSTETRICS & GYNECOLOGY

## 2018-07-11 RX ORDER — FAMOTIDINE 20 MG/1
TABLET, FILM COATED ORAL
COMMUNITY
Start: 2016-07-07 | End: 2018-07-11

## 2018-07-11 RX ORDER — LEVONORGESTREL AND ETHINYL ESTRADIOL 0.1-0.02MG
KIT ORAL
COMMUNITY
End: 2018-07-11

## 2018-07-11 RX ORDER — PANTOPRAZOLE SODIUM 40 MG/1
TABLET, DELAYED RELEASE ORAL
COMMUNITY
End: 2018-07-11

## 2018-07-11 RX ORDER — LEVONORGESTREL AND ETHINYL ESTRADIOL 0.1-0.02MG
1 KIT ORAL DAILY
Qty: 84 TABLET | Refills: 4 | Status: SHIPPED | OUTPATIENT
Start: 2018-07-11 | End: 2019-05-29 | Stop reason: SDUPTHER

## 2018-07-11 RX ORDER — FAMOTIDINE 20 MG/1
TABLET, FILM COATED ORAL
Refills: 0 | COMMUNITY
Start: 2018-06-12 | End: 2020-09-23 | Stop reason: ALTCHOICE

## 2018-07-11 RX ORDER — ISOTRETINOIN 30 MG/1
30 CAPSULE ORAL 2 TIMES DAILY
Refills: 0 | COMMUNITY
Start: 2018-07-02 | End: 2019-09-18

## 2018-07-11 RX ORDER — ISOTRETINOIN 30 MG/1
CAPSULE ORAL
COMMUNITY
End: 2018-07-11

## 2018-07-11 NOTE — PROGRESS NOTES
A/P    1  Annual exam    Pap - pt is 26years old first pap smear   patient was educated about the scheduling of paps and recommendations    2  PCOS -   Pt is currently on ocp and cycles are regular not having any side effects and there are no identifiable contraindications will cont on ocp  Reordered for her today     3  Patient is sunbathing and is very tan advised to use sun screen to protect her skin  from cancer       BURAK Lindo 77 is a 26 y o  female here for a routine exam   Current complaints: none   For her first GYN visit and refill of the OCP         Gynecologic History       LMP - regular on ocp        Not currently sexual active but when is uses condoms       diagnosed with PCOS and is on OCP for regulation of the cycle    Obstetric History       GoPo      The following portions of the patient's history were reviewed and updated as appropriate: allergies, current medications, past family history, past medical history, past social history, past surgical history and problem list     Review of Systems  A comprehensive review of systems was negative except for: PCOS and irregular cycles     Objective    /76 (BP Location: Left arm, Patient Position: Sitting, Cuff Size: Standard)   Ht 5' 3 5" (1 613 m)   Wt 57 8 kg (127 lb 6 4 oz)   LMP  (LMP Unknown) Comment: sometime last month  BMI 22 21 kg/m²   General appearance: alert and oriented, in no acute distress, appears stated age and cooperative  Neck: no adenopathy, supple, symmetrical, trachea midline and thyroid not enlarged, symmetric, no tenderness/mass/nodules  Lungs: symmetrical air enty   Breasts: normal appearance, no masses or tenderness  Heart: regular rate  Abdomen: soft, non-tender; bowel sounds normal; no masses,  no organomegaly  Pelvic: external genitalia normal, vagina normal without discharge, uterus normal size, shape, and consistency, no cervical motion tenderness, cervix normal in appearance, no adnexal masses or tenderness and PAP done today   Lymph nodes: negative

## 2018-07-11 NOTE — PATIENT INSTRUCTIONS
Polycystic Ovarian Syndrome   WHAT YOU NEED TO KNOW:   Polycystic ovarian syndrome (PCOS) is a hormone disorder that causes cysts to form on your ovaries  Cysts are bumps that are filled with fluid  The cysts can prevent your ovaries from working correctly  DISCHARGE INSTRUCTIONS:   Medicines:   · Birth control pills: These medicines have female hormones, and may decrease male hormone levels  Birth control pills may control your periods, prevent cysts, or cause them to shrink  They also help decrease your risk of endometrial cancer and correct abnormal bleeding  · Hypoglycemic medicines: These help to lower your blood sugar levels and decrease insulin resistance  They are also used to lower male hormone levels and help you ovulate  · NSAIDs:  These medicines decrease swelling and pain  You can buy NSAIDs without a doctor's order  Ask your healthcare provider which medicine is right for you, and how much to take  Take as directed  NSAIDs can cause stomach bleeding or kidney problems if not taken correctly  · Take your medicine as directed  Contact your healthcare provider if you think your medicine is not helping or if you have side effects  Tell him of her if you are allergic to any medicine  Keep a list of the medicines, vitamins, and herbs you take  Include the amounts, and when and why you take them  Bring the list or the pill bottles to follow-up visits  Carry your medicine list with you in case of an emergency  Follow up with your healthcare provider or gynecologist as directed: You may need to return to have more tests  Write down your questions so you remember to ask them during your visits  Manage your blood sugar and blood pressure: Your healthcare provider may want you to check your blood sugar levels and blood pressure at home  Keep a record and bring this to your follow-up visits  Blood sugar is measured with a glucose monitor  The monitor tests a small drop of blood   Blood pressure is measured with a cuff that you put on your arm and tighten  Ask for more information on how to measure your blood sugar and blood pressure  Manage your symptoms:   · Maintain a healthy weight:  Ask your healthcare provider how much you should weigh  Ask him to help you create a weight loss plan if you are overweight  Weight loss may help reduce the complications of PCOS  · Exercise:  Ask your healthcare provider about the best exercise plan for you  Exercise can help decrease blood sugar and blood pressure  It may also help with weight loss  · Eat a variety of healthy foods:  Healthy foods include fruits, vegetables, whole-grain breads, low-fat dairy products, beans, lean meats, and fish  A dietitian may help you plan meals that are lower in carbohydrates to help you manage your blood sugar levels  Too much carbohydrate at one time can raise your blood sugar to a high level  Contact your healthcare provider or gynecologist if:   · You have a fever  · You feel weak or tired  · You have pain during sex  · Your pain is worse or does not go away after you take your pain medicine  · You have trouble urinating or emptying your bladder completely  · You have questions or concerns about your condition or care  Return to the emergency department if:   · You have a severe headache or feel dizzy  · You vomit multiple times and cannot keep food or liquids down  · You have blurred or double vision  · Your breath has a fruity sweet smell, or you feel short of breath  · You have severe lower abdominal or pelvic pain  © 2017 [address] Information is for End User's use only and may not be sold, redistributed or otherwise used for commercial purposes  All illustrations and images included in CareNotes® are the copyrighted property of [employer]  or [employer]  The above information is an  only   It is not intended as medical advice for individual conditions or treatments  Talk to your doctor, nurse or pharmacist before following any medical regimen to see if it is safe and effective for you

## 2018-07-17 LAB
LAB AP GYN PRIMARY INTERPRETATION: NORMAL
Lab: NORMAL

## 2018-07-17 NOTE — PROGRESS NOTES
Pt needs to have a repeat pap do to above please put her on when she wants to she can be squeezed into any spot

## 2018-08-01 ENCOUNTER — OFFICE VISIT (OUTPATIENT)
Dept: OBGYN CLINIC | Facility: CLINIC | Age: 23
End: 2018-08-01
Payer: COMMERCIAL

## 2018-08-01 VITALS
WEIGHT: 126.4 LBS | BODY MASS INDEX: 21.58 KG/M2 | DIASTOLIC BLOOD PRESSURE: 74 MMHG | HEIGHT: 64 IN | SYSTOLIC BLOOD PRESSURE: 108 MMHG

## 2018-08-01 DIAGNOSIS — Z12.4 SCREENING FOR CERVICAL CANCER: Primary | ICD-10-CM

## 2018-08-01 DIAGNOSIS — Z11.51 SCREENING FOR HPV (HUMAN PAPILLOMAVIRUS): ICD-10-CM

## 2018-08-01 PROCEDURE — G0145 SCR C/V CYTO,THINLAYER,RESCR: HCPCS | Performed by: OBSTETRICS & GYNECOLOGY

## 2018-08-01 PROCEDURE — 99211 OFF/OP EST MAY X REQ PHY/QHP: CPT | Performed by: OBSTETRICS & GYNECOLOGY

## 2018-08-01 NOTE — PATIENT INSTRUCTIONS
Pap Smear   GENERAL INFORMATION:   What is a Pap smear? A Pap smear, or Pap test, is a procedure to check your cervix for abnormal cells  The cervix is the narrow opening at the bottom of your uterus  The cervix meets the top part of the vagina  How do I prepare for a Pap smear? The best time to schedule the test is right after your period stops  Do not have a Pap smear during your monthly period  Do not have intercourse or put anything in your vagina for 24 hours before your test    What will happen during a Pap smear? · You will lie on your back and place your feet on footrests called stirrups  Your caregiver will gently insert a device called a speculum into your vagina  The speculum is used to spread the walls of your vagina so he can see your cervix  He will use a thin brush or cotton swab to collect cells from the inside of your cervix  · Your caregiver will also collect cells from the surface of your cervix with a plastic or wooden tool called a spatula  He may also gently scrape the upper part of your vagina for a sample  The samples are placed in a container with liquid or on a glass slide  They are sent to a lab and examined for abnormal cells  How often do I need a Pap smear? Pap smears are usually done every 1 to 3 years  You may need a Pap smear more often if you have any of the following:  · Positive test result for the human papillomavirus (HPV)    · Cervical intraepithelial neoplasm or cervical cancer    · HIV    · A weak immune system    · Exposure to diethylstilbestrol (CHELSEA) medicine when your mother was pregnant with you  CARE AGREEMENT:   You have the right to help plan your care  Learn about your health condition and how it may be treated  Discuss treatment options with your caregivers to decide what care you want to receive  You always have the right to refuse treatment  The above information is an  only   It is not intended as medical advice for individual conditions or treatments  Talk to your doctor, nurse or pharmacist before following any medical regimen to see if it is safe and effective for you  © 2014 [address] is for End User's use only and may not be sold, redistributed or otherwise used for commercial purposes  All illustrations and images included in CareNotes® are the copyrighted property of [employer]  or [employer]

## 2018-08-01 NOTE — PROGRESS NOTES
Patient presents today for just a repeat Pap smear  Left breast being denied have enough tissue for interpretation    Patient has no new complaints patient will follow up after this Pap

## 2018-08-02 ENCOUNTER — EVALUATION (OUTPATIENT)
Dept: PHYSICAL THERAPY | Facility: MEDICAL CENTER | Age: 23
End: 2018-08-02
Payer: COMMERCIAL

## 2018-08-02 DIAGNOSIS — M53.3 SACROILIAC DYSFUNCTION: Primary | ICD-10-CM

## 2018-08-02 PROCEDURE — 97164 PT RE-EVAL EST PLAN CARE: CPT | Performed by: PHYSICAL THERAPIST

## 2018-08-02 PROCEDURE — G8991 OTHER PT/OT GOAL STATUS: HCPCS | Performed by: PHYSICAL THERAPIST

## 2018-08-02 PROCEDURE — G8990 OTHER PT/OT CURRENT STATUS: HCPCS | Performed by: PHYSICAL THERAPIST

## 2018-08-02 NOTE — PROGRESS NOTES
Re-Evaluation     Today's date: 2018  Patient name: Valarie Chao  : 1995  MRN: [id number]  Referring provider: Dasia Michael DO  Dx:   Encounter Diagnosis     ICD-10-CM    1  Sacroiliac dysfunction M53 3                   Assessment  Impairments: abnormal coordination, abnormal muscle firing, abnormal muscle tone, abnormal or restricted ROM, abnormal movement, activity intolerance, impaired physical strength, lacks appropriate home exercise program and pain with function    Assessment details: Rosaura Patel is a pleasant 24 y/o female who has undergone 10 visits to PT to address her anterior right hip pain and lower back discomfort  At this point her hip pain has been completely abolished however she continues to have issues with lower back discomfort and muscular tightness  Patient's symptoms seem to be most related to 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  She has been doing her HEP over the past 4 weeks with no reduction in her pain  It was suggested at this point that she purchase an SIJ brace  Patient will be placed on hold and call when she receives her brace for proper fitting       Understanding of Dx/Px/POC: good   Prognosis: good  Prognosis details: Positive prognostic indicators include positive attitude toward recovery        Goals    - Pt I with initial HEP in 1-2 visits GOAL MET  - Improve ROM equal to contralateral side in 4-6 weeks GOAL MET  - Increase strength to 5/5 in all affected areas in 4-6 week NOT MET    Functional Goals NOT MET  - Increase Functional Status Measure measured by [facility] by [facility]  - Patient will be independent with comprehensive HEP in 6-8 weeks  - Return to ADL's as desired     Plan    Planned modality interventions: thermotherapy: hydrocollator packs  Planned therapy interventions: abdominal trunk stabilization, activity modification, joint mobilization, manual therapy, motor coordination training, neuromuscular re-education, patient education, self care, therapeutic activities, therapeutic exercise, home exercise program, behavior modification, postural training, transfer training, IADL retraining and graded activity  Treatment plan discussed with: patient  Plan details: 1x a week moving toward HEP over the next 4 weeks          Subjective Evaluation  Patient states that she is having no pain in the hip at this point  She continues to have lower back discomfort with seemingly no change  Patient saw Dr Darius Barrera last week and she would like her to continue with PT to address her issues  Pain  Current pain rating: 3  At best pain ratin  At worst pain ratin  Quality: throbbing     Treatments  Previous treatment: chiropractic         Objective      Palpation   Left   Hypertonic in the erector spinae and quadratus lumborum       Right   Hypertonic in the erector spinae and quadratus lumborum     Hypotonic in the transverse abdominus       Neurological Testing      Sensation      Normal   No signs of radiculopathy or neuropathology     Reflexes   Left   Patellar (L4): normal (2+)  Achilles (S1): normal (2+)  Babinski sign: negative  Clonus sign: negative     Right   Patellar (L4): normal (2+)  Achilles (S1): normal (2+)  Babinski sign: negative  Clonus sign: negative     Active Range of Motion      Lumbar   Flexion: WFL and with pain  Extension: WFL and with pain  Left lateral flexion: WFL  Right lateral flexion: WFL  Left rotation: WFL  Right rotation: WFL  Left Knee   Normal active range of motion     Right Knee   Normal active range of motion    Left Ankle/Foot   Normal active range of motion     Right Ankle/Foot   Normal active range of motion     Passive Range of Motion   Left Hip   Internal rotation (prone): 50 degrees      Right Hip   Internal rotation (prone): 50 degrees      Strength/Myotome Testing      Lumbar   Left   Heel walk: normal  Toe walk: normal     Right   Heel walk: normal  Toe walk: normal     right Hip   Planes of Motion   Flexion: WFL  Extension: 4+  Abduction: 4+  Adduction: WFL  External rotation: 4+     left Hip   Planes of Motion   Flexion: WFL  Extension: 5  Abduction: 5  Adduction: WFL  External rotation:5     Left Knee   Flexion: WFL  Extension: WFL     Right Knee   Flexion: WFL  Extension: WFL     Left Ankle/Foot   Dorsiflexion: WFL  Plantar flexion: WFL  Inversion: WFL  Eversion: WFL  Great toe extension: WFL     Right Ankle/Foot   Dorsiflexion: WFL  Plantar flexion: WFL  Inversion: WFL  Eversion: WFL  Great toe extension: WFL        Muscle Activation     Difficulty with activation of right hip flexors due to pain   Patient demonstrates poor activation of Transversus Abdominus and multifidus force couple and loss of feed forward mechanism with reaching tasks    Patient was able to perform activation with cueing

## 2018-08-03 ENCOUNTER — TELEPHONE (OUTPATIENT)
Dept: OBGYN CLINIC | Facility: CLINIC | Age: 23
End: 2018-08-03

## 2018-08-03 LAB
LAB AP GYN PRIMARY INTERPRETATION: NORMAL
Lab: NORMAL

## 2018-12-12 ENCOUNTER — EVALUATION (OUTPATIENT)
Dept: PHYSICAL THERAPY | Facility: MEDICAL CENTER | Age: 23
End: 2018-12-12
Payer: COMMERCIAL

## 2018-12-12 DIAGNOSIS — M25.551 RIGHT HIP PAIN: ICD-10-CM

## 2018-12-12 PROCEDURE — G8979 MOBILITY GOAL STATUS: HCPCS | Performed by: PHYSICAL THERAPIST

## 2018-12-12 PROCEDURE — G8978 MOBILITY CURRENT STATUS: HCPCS | Performed by: PHYSICAL THERAPIST

## 2018-12-12 PROCEDURE — 97161 PT EVAL LOW COMPLEX 20 MIN: CPT | Performed by: PHYSICAL THERAPIST

## 2018-12-12 PROCEDURE — 97112 NEUROMUSCULAR REEDUCATION: CPT | Performed by: PHYSICAL THERAPIST

## 2018-12-12 NOTE — PROGRESS NOTES
PT Evaluation     Today's date: 2018  Patient name: Cesar Huber  : 1995  MRN: [id number]  Referring provider: Jesús Win*  Dx: No diagnosis found  Assessment  Assessment details: Cesar Huber is a 26 y o  y/o female who presents s/p R hip labral repair  Patient presents to PT ambulating without the use of AD  The patient's greatest concerns are the possibility that she re-injured the labrum by falling post surgery  Patient reports that she had a R hip labral repair by Dr Roberto Doshi on 18 and d/c'd crutches 2 weeks post operatively  She is no longer ambulating with crutches and has not been given weight bearing, or hip precautions  Patient states that she has followed up with Dr Roberto Doshi and will be following up again on 2018 because she has been in significant pain since surgery  Patient presents with right hip extensibility deficits and hip accessory weakness throughout  Pt  will benefit from skilled PT services that includes manual therapy techniques to enhance tissue extensibility, neuromuscular re-education to facilitate motor control, therapeutic exercise to increase functional mobility, and modalities prn to reduce pain and inflammation  Impairments: abnormal coordination, abnormal gait, abnormal muscle firing, abnormal muscle tone, abnormal or restricted ROM, abnormal movement, activity intolerance, impaired balance, impaired physical strength, lacks appropriate home exercise program, pain with function, weight-bearing intolerance and poor body mechanics  Barriers to therapy: Post operative fall and possible re-injury, as well as non-compliance with weight bearing and hip precautions    Understanding of Dx/Px/POC: good   Prognosis: fair    Goals  Impairment Goals  - Pt I with initial HEP in 1-2 visits  - Improve ROM equal to contralateral side in 4-6 weeks  - Increase strength to 5/5 in all affected areas in 4-6 weeks    Functional Goals  - Increase Functional Status Measure to: 60 in 6-8 weeks  - Patient will be independent with comprehensive HEP in 6-8 weeks  - Ambulation is improved to prior level of function in 6-8 weeks  - Stair climbing is improved to prior level of function in 6-8 weeks  - Squatting is improved to prior level of function in 6-8 weeks    Plan  Patient would benefit from: PT eval  Planned modality interventions: cryotherapy and thermotherapy: hydrocollator packs  Planned therapy interventions: joint mobilization, manual therapy, neuromuscular re-education, patient education, postural training, strengthening, stretching, therapeutic exercise, graded exercise, home exercise program, activity modification, ADL retraining, balance/weight bearing training, behavior modification, flexibility, functional ROM exercises and body mechanics training  Frequency: 2x week  Duration in weeks: 12  Treatment plan discussed with: patient      Subjective Evaluation    History of Present Illness  Date of surgery: 18  Mechanism of injury: Patient reports that she had a R hip labral repair by Dr Roberto Doshi on 18 and d/c'd crutches 2 weeks post operatively  She is no longer ambulating with an AD  Immediately after sx, patient reports falling on her right hip on 18  Patient states that she has followed up with Dr Roberto Doshi and will be following up again on 2018 because she has been in significant pain since the fall  Pain  Current pain ratin  At best pain ratin  At worst pain ratin  Quality: tight and pulling  Alleviating factors: Tylenol      Treatments  Current treatment: physical therapy  Patient Goals  Patient goals for therapy: decreased pain, increased motion, improved balance, increased strength, independence with ADLs/IADLs and return to sport/leisure activities  Patient goal: Weight lifting       Objective     Observations     Right Hip  Negative for drainage and incision  Additional Observation Details  Incision clean and dry/signs and symptoms of infection reviewed  Tenderness     Right Hip   No tenderness in the ASIS and PSIS  Lumbar Screen  Lumbar range of motion within normal limits  Neurological Testing     Sensation     Hip   Left Hip   Intact: light touch    Right Hip   Intact: light touch    Active Range of Motion   Left Hip   Flexion: 120 degrees   Extension: 12 degrees   Abduction: 35 degrees   Adduction: 30 degrees   External rotation (prone): 45 degrees   Internal rotation (prone): 55 degrees     Right Hip   Flexion: 90 degrees   Extension: 10 degrees   Abduction: 20 degrees   Adduction: Right hip active adduction: midline  External rotation (prone): 35 degrees   Internal rotation (prone): Right hip active internal rotation prone: Not tested  Strength/Myotome Testing     Left Hip   Normal muscle strength    Right Hip   Planes of Motion   Flexion: 3  Extension: 3  Abduction: 3  Right hip adduction strength: Not tested      Isolated Muscles   Gluteus maximums: 3  Gluteus medius: 3  TFL: 3  Iliopsoas: 3    Precautions: Hip precautions until further follow up with physician    Daily Treatment Diary     Manual  12/12                                                                                 Exercise Diary                           Bike             Bridges 2x10 5s hold ball            Clams 30x 5s hold sup            Prone glute set 10x 5s hold                                      Prone quad str 3x30s                                                                                                                                                                            Modalities                                                               Megan Bullock, YULY  12/12/2018,2:56 PM

## 2018-12-12 NOTE — LETTER
2018    Nj Galeano MD  [address] [address]    Patient: Justyna Tristan   YOB: 1995   Date of Visit: 2018     Encounter Diagnosis     ICD-10-CM    1  Right hip pain M25 551        Dear Dr Jo Wilkerson:    Please review the attached Plan of Care from [facility] recent visit  Please verify that you agree therapy should continue by signing the attached document and sending it back to our office  If you have any questions or concerns, please don't hesitate to call  Sincerely,    Ministerio Henry PT      Referring Provider:      I certify that I have read the below Plan of Care and certify the need for these services furnished under this plan of treatment while under my care  Nj Galeano MD  [address] [place] [address],[address]: [phone]          PT Evaluation     Today's date: 2018  Patient name: Justyna Tristan  : 1995  MRN: [id number]  Referring provider: Donovan Price*  Dx: No diagnosis found  Assessment  Assessment details: Justyna Tristan is a 26 y o  y/o female who presents s/p R hip labral repair  Patient presents to PT ambulating without the use of AD  The patient's greatest concerns are the possibility that she re-injured the labrum by falling post surgery  Patient reports that she had a R hip labral repair by Dr Jo Wilkerson on 18 and d/c'd crutches 2 weeks post operatively  She is no longer ambulating with crutches and has not been given weight bearing, or hip precautions  Patient states that she has followed up with Dr Jo Wilkerson and will be following up again on 2018 because she has been in significant pain since surgery  Patient presents with right hip extensibility deficits and hip accessory weakness throughout    Pt  will benefit from skilled PT services that includes manual therapy techniques to enhance tissue extensibility, neuromuscular re-education to facilitate motor control, therapeutic exercise to increase functional mobility, and modalities prn to reduce pain and inflammation  Impairments: abnormal coordination, abnormal gait, abnormal muscle firing, abnormal muscle tone, abnormal or restricted ROM, abnormal movement, activity intolerance, impaired balance, impaired physical strength, lacks appropriate home exercise program, pain with function, weight-bearing intolerance and poor body mechanics  Barriers to therapy: Post operative fall and possible re-injury, as well as non-compliance with weight bearing and hip precautions  Understanding of Dx/Px/POC: good   Prognosis: fair    Goals  Impairment Goals  - Pt I with initial HEP in 1-2 visits  - Improve ROM equal to contralateral side in 4-6 weeks  - Increase strength to 5/5 in all affected areas in 4-6 weeks    Functional Goals  - Increase Functional Status Measure to: 60 in 6-8 weeks  - Patient will be independent with comprehensive HEP in 6-8 weeks  - Ambulation is improved to prior level of function in 6-8 weeks  - Stair climbing is improved to prior level of function in 6-8 weeks  - Squatting is improved to prior level of function in 6-8 weeks    Plan  Patient would benefit from: PT eval  Planned modality interventions: cryotherapy and thermotherapy: hydrocollator packs  Planned therapy interventions: joint mobilization, manual therapy, neuromuscular re-education, patient education, postural training, strengthening, stretching, therapeutic exercise, graded exercise, home exercise program, activity modification, ADL retraining, balance/weight bearing training, behavior modification, flexibility, functional ROM exercises and body mechanics training  Frequency: 2x week  Duration in weeks: 12  Treatment plan discussed with: patient      Subjective Evaluation    History of Present Illness  Date of surgery: 11/19/18    Mechanism of injury: Patient reports that she had a R hip labral repair by Dr Laci Rosenberg on 18 and d/c'd crutches 2 weeks post operatively  She is no longer ambulating with an AD  Immediately after sx, patient reports falling on her right hip on 18  Patient states that she has followed up with Dr Laci Rosenberg and will be following up again on 2018 because she has been in significant pain since the fall  Pain  Current pain ratin  At best pain ratin  At worst pain ratin  Quality: tight and pulling  Alleviating factors: Tylenol  Treatments  Current treatment: physical therapy  Patient Goals  Patient goals for therapy: decreased pain, increased motion, improved balance, increased strength, independence with ADLs/IADLs and return to sport/leisure activities  Patient goal: Weight lifting       Objective     Observations     Right Hip  Negative for drainage and incision  Additional Observation Details  Incision clean and dry/signs and symptoms of infection reviewed  Tenderness     Right Hip   No tenderness in the ASIS and PSIS  Lumbar Screen  Lumbar range of motion within normal limits  Neurological Testing     Sensation     Hip   Left Hip   Intact: light touch    Right Hip   Intact: light touch    Active Range of Motion   Left Hip   Flexion: 120 degrees   Extension: 12 degrees   Abduction: 35 degrees   Adduction: 30 degrees   External rotation (prone): 45 degrees   Internal rotation (prone): 55 degrees     Right Hip   Flexion: 90 degrees   Extension: 10 degrees   Abduction: 20 degrees   Adduction: Right hip active adduction: midline  External rotation (prone): 35 degrees   Internal rotation (prone): Right hip active internal rotation prone: Not tested  Strength/Myotome Testing     Left Hip   Normal muscle strength    Right Hip   Planes of Motion   Flexion: 3  Extension: 3  Abduction: 3  Right hip adduction strength: Not tested      Isolated Muscles   Gluteus maximums: 3  Gluteus medius: 3  TFL: 3  Iliopsoas: 3    Precautions: Hip precautions until further follow up with physician    Daily Treatment Diary     Manual  12/12                                                                                 Exercise Diary                           Bike             Bridges 2x10 5s hold ball            Clams 30x 5s hold sup            Prone glute set 10x 5s hold                                      Prone quad str 3x30s                                                                                                                                                                            Modalities                                                               Kailash Tapia, PT  12/12/2018,2:56 PM

## 2018-12-19 ENCOUNTER — OFFICE VISIT (OUTPATIENT)
Dept: PHYSICAL THERAPY | Facility: MEDICAL CENTER | Age: 23
End: 2018-12-19
Payer: COMMERCIAL

## 2018-12-19 DIAGNOSIS — M25.551 RIGHT HIP PAIN: Primary | ICD-10-CM

## 2018-12-19 PROCEDURE — 97140 MANUAL THERAPY 1/> REGIONS: CPT | Performed by: PHYSICAL THERAPIST

## 2018-12-19 PROCEDURE — 97112 NEUROMUSCULAR REEDUCATION: CPT | Performed by: PHYSICAL THERAPIST

## 2018-12-19 PROCEDURE — 97110 THERAPEUTIC EXERCISES: CPT | Performed by: PHYSICAL THERAPIST

## 2018-12-19 NOTE — PROGRESS NOTES
Daily Note     Today's date: 2018  Patient name: Johnathon Venegas  : 1995  MRN: [id number]  Referring provider: Emmanuel Franco  Dx:   Encounter Diagnosis     ICD-10-CM    1  Right hip pain M25 551                   Subjective: Patient reports that she is feeling slightly better and follows up with physician on Friday  Objective: See treatment diary below    Precautions: Hip precautions until further follow up with physician    Daily Treatment Diary     Manual             STM/MFR   AZ           Manual hip flex str  AZ                                                      Exercise Diary                           Bike             Bridges 2x10 5s hold ball 3x10 5s hold ball           Clams 30x 5s hold sup 30x 5s hold sup           Prone glute set 10x 5s hold 10x 5s hold                                     Prone quad str 3x30s 3x30s           Hip flex str EOT  3x30s                                                                                                                                                              Modalities                                                         Assessment:  Patient presents with right tissue extensibility deficits and right hip accessory weakness  Myofascial release performed and manual stretching  Tolerated treatment well  Patient exhibited good technique with therapeutic exercises  Plan: Continue per plan of care

## 2018-12-26 ENCOUNTER — APPOINTMENT (OUTPATIENT)
Dept: PHYSICAL THERAPY | Facility: MEDICAL CENTER | Age: 23
End: 2018-12-26
Payer: COMMERCIAL

## 2018-12-28 ENCOUNTER — APPOINTMENT (OUTPATIENT)
Dept: PHYSICAL THERAPY | Facility: MEDICAL CENTER | Age: 23
End: 2018-12-28
Payer: COMMERCIAL

## 2018-12-28 ENCOUNTER — OFFICE VISIT (OUTPATIENT)
Dept: PHYSICAL THERAPY | Facility: MEDICAL CENTER | Age: 23
End: 2018-12-28
Payer: COMMERCIAL

## 2018-12-28 DIAGNOSIS — M25.551 RIGHT HIP PAIN: Primary | ICD-10-CM

## 2018-12-28 PROCEDURE — 97140 MANUAL THERAPY 1/> REGIONS: CPT | Performed by: PHYSICAL THERAPIST

## 2018-12-28 PROCEDURE — 97110 THERAPEUTIC EXERCISES: CPT | Performed by: PHYSICAL THERAPIST

## 2018-12-28 PROCEDURE — 97112 NEUROMUSCULAR REEDUCATION: CPT | Performed by: PHYSICAL THERAPIST

## 2018-12-28 NOTE — PROGRESS NOTES
Daily Note     Today's date: 2018  Patient name: Meryle Common  : 1995  MRN: [id number]  Referring provider: Shwetha Mcnamara  Dx:   Encounter Diagnosis     ICD-10-CM    1  Right hip pain M25 551          Subjective: Pt reports that she is generally doing well  Pt states that she has some stiffness and soreness in the front of her hip today  Objective: See treatment diary below      Assessment: Tolerated treatment well  Patient demonstrated fatigue post treatment, exhibited good technique with therapeutic exercises and would benefit from continued PT  Pt would benefit from continued gradual strengthening  Plan: Continue per plan of care       Precautions: See protocol    Daily Treatment Diary     Manual            STM/MFR   AZ           Manual hip flex str  AZ           PROM R hip   HK                                        Exercise Diary                           Bike   10'          Bridges 2x10 5s hold ball 3x10 5s hold ball x30          Clams 30x 5s hold sup 30x 5s hold sup 3x10          Prone glute set 10x 5s hold 10x 5s hold 5"  x30  supine          Prone quad str 3x30s 3x30s 3x30"          Hip flex str EOT  3x30s Stand  3x30"          Ab bracing   5"  x30          Reverse clam shells   3x10          Prone hip ext   3x10          Glute raises   3x10          Piriformis str   3x30"          Add sq   5"  x30

## 2019-01-02 ENCOUNTER — APPOINTMENT (OUTPATIENT)
Dept: PHYSICAL THERAPY | Facility: MEDICAL CENTER | Age: 24
End: 2019-01-02
Payer: COMMERCIAL

## 2019-01-03 ENCOUNTER — APPOINTMENT (OUTPATIENT)
Dept: LAB | Age: 24
End: 2019-01-03

## 2019-01-03 ENCOUNTER — OFFICE VISIT (OUTPATIENT)
Dept: PHYSICAL THERAPY | Facility: MEDICAL CENTER | Age: 24
End: 2019-01-03
Payer: COMMERCIAL

## 2019-01-03 ENCOUNTER — APPOINTMENT (OUTPATIENT)
Dept: URGENT CARE | Age: 24
End: 2019-01-03

## 2019-01-03 ENCOUNTER — TRANSCRIBE ORDERS (OUTPATIENT)
Dept: URGENT CARE | Age: 24
End: 2019-01-03

## 2019-01-03 DIAGNOSIS — Z02.1 PRE-EMPLOYMENT EXAMINATION: Primary | ICD-10-CM

## 2019-01-03 DIAGNOSIS — M25.551 RIGHT HIP PAIN: Primary | ICD-10-CM

## 2019-01-03 DIAGNOSIS — Z02.1 PRE-EMPLOYMENT EXAMINATION: ICD-10-CM

## 2019-01-03 LAB — RUBV IGG SERPL IA-ACNC: 166.2 IU/ML

## 2019-01-03 PROCEDURE — 97112 NEUROMUSCULAR REEDUCATION: CPT | Performed by: PHYSICAL THERAPIST

## 2019-01-03 PROCEDURE — 97110 THERAPEUTIC EXERCISES: CPT | Performed by: PHYSICAL THERAPIST

## 2019-01-03 PROCEDURE — 86735 MUMPS ANTIBODY: CPT

## 2019-01-03 PROCEDURE — 86480 TB TEST CELL IMMUN MEASURE: CPT

## 2019-01-03 PROCEDURE — 86787 VARICELLA-ZOSTER ANTIBODY: CPT

## 2019-01-03 PROCEDURE — 36415 COLL VENOUS BLD VENIPUNCTURE: CPT

## 2019-01-03 PROCEDURE — 86762 RUBELLA ANTIBODY: CPT

## 2019-01-03 PROCEDURE — 86765 RUBEOLA ANTIBODY: CPT

## 2019-01-03 NOTE — PROGRESS NOTES
Daily Note     Today's date: 1/3/2019  Patient name: Mary Arvizu  : 1995  MRN: [id number]  Referring provider: Chai Wilkerson  Dx:   Encounter Diagnosis     ICD-10-CM    1  Right hip pain M25 551             Subjective: Pt reports that her R hip was a little sore after her last PT session, but the soreness went away the next day  Objective: See treatment diary below      Assessment: Tolerated treatment well  Patient demonstrated fatigue post treatment, exhibited good technique with therapeutic exercises and would benefit from continued PT  Pt is doing well and progressing appropriately  Plan: Continue per plan of care       Precautions: See protocol    Daily Treatment Diary     Manual  12/12 12/19 12/28 1/3         STM/MFR   AZ           Manual hip flex str  AZ           PROM R hip   HK HK         Long axis distraction R LE    HK                          Exercise Diary    12/28 1/3         Bike   10' 10'         Bridges 2x10 5s hold ball 3x10 5s hold ball x30 x40         Clams 30x 5s hold sup 30x 5s hold sup 3x10 3x15         Prone glute set 10x 5s hold 10x 5s hold 5"  x30  supine 5"  x30         Prone quad str 3x30s 3x30s 3x30" 3x30"         Hip flex str EOT  3x30s Stand  3x30" 3x30"         Ab bracing   5"  x30 With hip flexion  3x10           Reverse clam shells   3x10 3x15         Prone hip ext   3x10 3x12         Glute raises   3x10 3x12         Piriformis str   3x30" 3x30"         Add sq   5"  x30 With Br  3x10         Standing hip abd    3x10         Stool IR/ER    x30         Bridging with t-band abd    3x10  purp

## 2019-01-04 ENCOUNTER — APPOINTMENT (OUTPATIENT)
Dept: PHYSICAL THERAPY | Facility: MEDICAL CENTER | Age: 24
End: 2019-01-04
Payer: COMMERCIAL

## 2019-01-04 ENCOUNTER — OFFICE VISIT (OUTPATIENT)
Dept: PHYSICAL THERAPY | Facility: MEDICAL CENTER | Age: 24
End: 2019-01-04
Payer: COMMERCIAL

## 2019-01-04 DIAGNOSIS — M25.551 RIGHT HIP PAIN: Primary | ICD-10-CM

## 2019-01-04 PROCEDURE — 97140 MANUAL THERAPY 1/> REGIONS: CPT

## 2019-01-04 PROCEDURE — 97112 NEUROMUSCULAR REEDUCATION: CPT

## 2019-01-04 PROCEDURE — 97110 THERAPEUTIC EXERCISES: CPT

## 2019-01-04 NOTE — PROGRESS NOTES
Daily Note     Today's date: 2019  Patient name: Renetta Daniel  : 1995  MRN: [id number]  Referring provider: Rachel Saenz  Dx:   Encounter Diagnosis     ICD-10-CM    1  Right hip pain M25 551                   Subjective: Pt continues to note soreness in her hip the day after PT sessions  Objective: See treatment diary below  Daily Treatment Diary     Manual  12/12 12/19 12/28 1/3 1/4        STM/MFR   AZ           Manual hip flex str  AZ           PROM R hip   HK HK KO        Long axis distraction R LE    HK np                         Exercise Diary    12/28 1/3 1/4        Bike   10' 10' 10'        Bridges 2x10 5s hold ball 3x10 5s hold ball x30 x40 x40        Clams 30x 5s hold sup 30x 5s hold sup 3x10 3x15 3x15        Prone glute set 10x 5s hold 10x 5s hold 5"  x30  supine 5"  x30 5"  x30        Prone quad str 3x30s 3x30s 3x30" 3x30" 3x30"        Hip flex str EOT  3x30s Stand  3x30" 3x30" 3x30"        Ab bracing   5"  x30 With hip flexion  3x10   W/hip  Flexion  x30        Reverse clam shells   3x10 3x15 3x15        Prone hip ext   3x10 3x12 3x12        Glute raises   3x10 3x12 3x12        Piriformis str   3x30" 3x30" 3x30"        Add sq   5"  x30 With Br  3x10 W/  Bridge  3x10        Standing hip abd    3x10 3x10        Stool IR/ER    x30 x30        Bridging with t-band abd    3x10  purp 3x10  Purp                                        Assessment: Tolerated treatment well  Patient demonstrated fatigue post treatment, exhibited good technique with therapeutic exercises and would benefit from continued PT  Pt responding well to current tx plan  Plan: Continue per plan of care

## 2019-01-07 ENCOUNTER — OFFICE VISIT (OUTPATIENT)
Dept: PHYSICAL THERAPY | Facility: MEDICAL CENTER | Age: 24
End: 2019-01-07
Payer: COMMERCIAL

## 2019-01-07 ENCOUNTER — APPOINTMENT (OUTPATIENT)
Dept: PHYSICAL THERAPY | Facility: MEDICAL CENTER | Age: 24
End: 2019-01-07
Payer: COMMERCIAL

## 2019-01-07 DIAGNOSIS — M25.551 RIGHT HIP PAIN: Primary | ICD-10-CM

## 2019-01-07 LAB
GAMMA INTERFERON BACKGROUND BLD IA-ACNC: 0.02 IU/ML
M TB IFN-G BLD-IMP: NEGATIVE
M TB IFN-G CD4+ BCKGRND COR BLD-ACNC: 0 IU/ML
M TB IFN-G CD4+ BCKGRND COR BLD-ACNC: 0 IU/ML
MITOGEN IGNF BCKGRD COR BLD-ACNC: >10 IU/ML
VZV IGG SER IA-ACNC: ABNORMAL

## 2019-01-07 PROCEDURE — 97140 MANUAL THERAPY 1/> REGIONS: CPT

## 2019-01-07 PROCEDURE — 97110 THERAPEUTIC EXERCISES: CPT

## 2019-01-07 PROCEDURE — 97112 NEUROMUSCULAR REEDUCATION: CPT

## 2019-01-07 NOTE — PROGRESS NOTES
Daily Note     Today's date: 2019  Patient name: Ria Garcia  : 1995  MRN: [id number]  Referring provider: Moon Dye  Dx:   Encounter Diagnosis     ICD-10-CM    1  Right hip pain M25 551                   Subjective: Pt reports that her hip is sore after PT visits  Objective: See treatment diary below  Daily Treatment Diary     Manual  12/12 12/19 12/28 1/3 1/4 1/7       STM/MFR   AZ           Manual hip flex str  AZ           PROM R hip   HK HK KO KO       Long axis distraction R LE    HK np KO                        Exercise Diary    12/28 1/3 1/4 1/7       Bike   10' 10' 10' 10'       Bridges 2x10 5s hold ball 3x10 5s hold ball x30 x40 x40 x40       Clams 30x 5s hold sup 30x 5s hold sup 3x10 3x15 3x15 3x15       Prone glute set 10x 5s hold 10x 5s hold 5"  x30  supine 5"  x30 5"  x30 5"  x30  supine       Prone quad str 3x30s 3x30s 3x30" 3x30" 3x30" 3x30"       Hip flex str EOT  3x30s Stand  3x30" 3x30" 3x30" 3x30"       Ab bracing   5"  x30 With hip flexion  3x10   W/hip  Flexion  x30 w/bridge &   Hip  Flex  2x10       Reverse clam shells   3x10 3x15 3x15 3x15       Prone hip ext   3x10 3x12 3x12 3x12       Glute raises   3x10 3x12 3x12 3x12       Piriformis str   3x30" 3x30" 3x30" 3x30"       Add sq   5"  x30 With Br  3x10 W/  Bridge  3x10 W/  Bridge  3x10       Standing hip abd    3x10 3x10 3x15       Stool IR/ER    x30 x30 x30       Bridging with t-band abd    3x10  purp 3x10  Purp 3x10  Purp       SLS w/alpha      2x                          Assessment: Tolerated treatment well  Patient demonstrated fatigue post treatment, exhibited good technique with therapeutic exercises and would benefit from continued PT  Pt progressing well with current tx plan  Plan: Continue per plan of care

## 2019-01-08 LAB
MEV IGG SER QL: NORMAL
MUV IGG SER QL: NORMAL

## 2019-01-09 ENCOUNTER — APPOINTMENT (OUTPATIENT)
Dept: PHYSICAL THERAPY | Facility: MEDICAL CENTER | Age: 24
End: 2019-01-09
Payer: COMMERCIAL

## 2019-01-10 ENCOUNTER — OFFICE VISIT (OUTPATIENT)
Dept: PHYSICAL THERAPY | Facility: MEDICAL CENTER | Age: 24
End: 2019-01-10
Payer: COMMERCIAL

## 2019-01-10 DIAGNOSIS — M25.551 RIGHT HIP PAIN: Primary | ICD-10-CM

## 2019-01-10 PROCEDURE — 97140 MANUAL THERAPY 1/> REGIONS: CPT | Performed by: PHYSICAL THERAPIST

## 2019-01-10 PROCEDURE — 97112 NEUROMUSCULAR REEDUCATION: CPT | Performed by: PHYSICAL THERAPIST

## 2019-01-10 PROCEDURE — 97110 THERAPEUTIC EXERCISES: CPT | Performed by: PHYSICAL THERAPIST

## 2019-01-10 NOTE — PROGRESS NOTES
Daily Note     Today's date: 1/10/2019  Patient name: Mary Arvizu  : 1995  MRN: [id number]  Referring provider: Ky Tavarez  Dx:   Encounter Diagnosis     ICD-10-CM    1  Right hip pain M25 551             Subjective: Pt reports some intermittent anterior R hip pain over the past 2 days for no apparent reason  Pt notes that there is no particular trigger for her pain to occur  Objective: See treatment diary below      Assessment: Tolerated treatment well  Patient demonstrated fatigue post treatment, exhibited good technique with therapeutic exercises and would benefit from continued PT  Pts core and hip girdle strength are slowly improving  Plan: Continue per plan of care       Daily Treatment Diary     Manual  12/12 12/19 12/28 1/3 1/4 1/7 1/10      STM/MFR   AZ           Manual hip flex str  AZ           PROM R hip   HK HK KO KO HK      Long axis distraction R LE    HK np KO HK                       Exercise Diary    12/28 1/3 1/4 1/7 1/10      Bike   10' 10' 10' 10' 10'      Bridges 2x10 5s hold ball 3x10 5s hold ball x30 x40 x40 x40 x40 D/C     Clams 30x 5s hold sup 30x 5s hold sup 3x10 3x15 3x15 3x15 3x15      Prone glute set 10x 5s hold 10x 5s hold 5"  x30  supine 5"  x30 5"  x30 5"  x30  supine 5"  x30      Prone quad str 3x30s 3x30s 3x30" 3x30" 3x30" 3x30" 3x30"      Hip flex str EOT  3x30s Stand  3x30" 3x30" 3x30" 3x30" 3x30"      Ab bracing   5"  x30 With hip flexion  3x10   W/hip  Flexion  x30 w/bridge &   Hip  Flex  2x10 3x10      Reverse clam shells   3x10 3x15 3x15 3x15 3x15      Prone hip ext   3x10 3x12 3x12 3x12 3x15      Glute raises   3x10 3x12 3x12 3x12 3x15      Piriformis str   3x30" 3x30" 3x30" 3x30" 3x30"      Add sq   5"  x30 With Br  3x10 W/  Bridge  3x10 W/  Bridge  3x10 3x12      Standing hip abd    3x10 3x10 3x15 3x15      Stool IR/ER    x30 x30 x30 3x12      Bridging with t-band abd    3x10  purp 3x10  Purp 3x10  Purp 3x12  purp      SLS w/alpha 2x 2X      Ab bracing with hip flexion       3x15

## 2019-01-14 ENCOUNTER — APPOINTMENT (OUTPATIENT)
Dept: PHYSICAL THERAPY | Facility: MEDICAL CENTER | Age: 24
End: 2019-01-14
Payer: COMMERCIAL

## 2019-01-15 ENCOUNTER — OFFICE VISIT (OUTPATIENT)
Dept: PHYSICAL THERAPY | Facility: MEDICAL CENTER | Age: 24
End: 2019-01-15
Payer: COMMERCIAL

## 2019-01-15 DIAGNOSIS — M25.551 RIGHT HIP PAIN: Primary | ICD-10-CM

## 2019-01-15 PROCEDURE — 97110 THERAPEUTIC EXERCISES: CPT

## 2019-01-15 PROCEDURE — 97140 MANUAL THERAPY 1/> REGIONS: CPT

## 2019-01-15 NOTE — PROGRESS NOTES
Daily Note     Today's date: 1/15/2019  Patient name: Homer Ceron  : 1995  MRN: [id number]  Referring provider: Fox Lopez  Dx:   Encounter Diagnosis     ICD-10-CM    1  Right hip pain M25 551                   Subjective: Pt reports that her hip is feeling pretty good, but has some discomfort if she makes a sharp turn by accident  Objective: See treatment diary below    Daily Treatment Diary     Manual  12/12 12/19 12/28 1/3 1/4 1/7 1/10 1/15     STM/MFR   AZ           Manual hip flex str  AZ           PROM R hip   HK HK KO KO HK KO     Long axis distraction R LE    HK np KO HK np                      Exercise Diary    12/28 1/3 1/4 1/7 1/10 1/15     Bike   10' 10' 10' 10' 10' 10'     Bridges 2x10 5s hold ball 3x10 5s hold ball x30 x40 x40 x40 x40 D/C     Clams 30x 5s hold sup 30x 5s hold sup 3x10 3x15 3x15 3x15 3x15 np     Prone glute set 10x 5s hold 10x 5s hold 5"  x30  supine 5"  x30 5"  x30 5"  x30  supine 5"  x30 np     Prone quad str 3x30s 3x30s 3x30" 3x30" 3x30" 3x30" 3x30" manua     Hip flex str EOT  3x30s Stand  3x30" 3x30" 3x30" 3x30" 3x30" 3x30"     Ab bracing   5"  x30 With hip flexion  3x10   W/hip  Flexion  x30 w/bridge &   Hip  Flex  2x10 3x10 np     Reverse clam shells   3x10 3x15 3x15 3x15 3x15 np     Prone hip ext   3x10 3x12 3x12 3x12 3x15 np     Glute raises   3x10 3x12 3x12 3x12 3x15 np     Piriformis str   3x30" 3x30" 3x30" 3x30" 3x30" np     Add sq   5"  x30 With Br  3x10 W/  Bridge  3x10 W/  Bridge  3x10 3x12 np     Standing hip abd    3x10 3x10 3x15 3x15 np     Stool IR/ER    x30 x30 x30 3x12 3x12     Bridging with t-band abd    3x10  purp 3x10  Purp 3x10  Purp 3x12  purp np     SLS w/alpha      2x 2X np     Ab bracing with hip flexion       3x15 np         Assessment: Tolerated treatment well   Patient demonstrated fatigue post treatment, exhibited good technique with therapeutic exercises and would benefit from continued PT  Pt unable to complete all ex's today 2* time constraints  Plan: Continue per plan of care

## 2019-01-16 ENCOUNTER — APPOINTMENT (OUTPATIENT)
Dept: PHYSICAL THERAPY | Facility: MEDICAL CENTER | Age: 24
End: 2019-01-16
Payer: COMMERCIAL

## 2019-01-17 ENCOUNTER — OFFICE VISIT (OUTPATIENT)
Dept: PHYSICAL THERAPY | Facility: MEDICAL CENTER | Age: 24
End: 2019-01-17
Payer: COMMERCIAL

## 2019-01-17 DIAGNOSIS — M25.551 RIGHT HIP PAIN: Primary | ICD-10-CM

## 2019-01-17 PROCEDURE — 97140 MANUAL THERAPY 1/> REGIONS: CPT

## 2019-01-17 PROCEDURE — 97112 NEUROMUSCULAR REEDUCATION: CPT

## 2019-01-17 PROCEDURE — 97110 THERAPEUTIC EXERCISES: CPT

## 2019-01-17 NOTE — PROGRESS NOTES
Daily Note     Today's date: 2019  Patient name: Doug Hale  : 1995  MRN: [id number]  Referring provider: Tatiana Osuna  Dx:   Encounter Diagnosis     ICD-10-CM    1  Right hip pain M25 551                   Subjective: Pt reports that her anterior hip feels tight and feels as though it needs to "pop"  Objective: See treatment diary below    Daily Treatment Diary     Manual  12/12 12/19 12/28 1/3 1/4 1/7 1/10 1/15 1/17    STM/MFR   AZ           Manual hip flex str  AZ           PROM R hip   HK HK KO KO HK KO KO    Long axis distraction R LE    HK np KO HK np KO                     Exercise Diary    12/28 1/3 1/4 1/7 1/10 1/15 1/17    Bike   10' 10' 10' 10' 10' 10' 10'    Bridges 2x10 5s hold ball 3x10 5s hold ball x30 x40 x40 x40 x40 D/C     Clams 30x 5s hold sup 30x 5s hold sup 3x10 3x15 3x15 3x15 3x15 np 3x12    Prone glute set 10x 5s hold 10x 5s hold 5"  x30  supine 5"  x30 5"  x30 5"  x30  supine 5"  x30 np 5"  x30    Prone quad str 3x30s 3x30s 3x30" 3x30" 3x30" 3x30" 3x30" manua manua    Hip flex str EOT  3x30s Stand  3x30" 3x30" 3x30" 3x30" 3x30" 3x30" 3x30"    Ab bracing   5"  x30 With hip flexion  3x10   W/hip  Flexion  x30 w/bridge &   Hip  Flex  2x10 3x10 np 3x10    Reverse clam shells   3x10 3x15 3x15 3x15 3x15 np 3x12    Prone hip ext   3x10 3x12 3x12 3x12 3x15 np 3x15    Glute raises   3x10 3x12 3x12 3x12 3x15 np 3x15    Piriformis str   3x30" 3x30" 3x30" 3x30" 3x30" np 3x30"    Add sq   5"  x30 With Br  3x10 W/  Bridge  3x10 W/  Bridge  3x10 3x12 np W/  Bridge  3x10    Standing hip abd    3x10 3x10 3x15 3x15 np 3x15    Stool IR/ER    x30 x30 x30 3x12 3x12 3x12    Bridging with t-band abd    3x10  purp 3x10  Purp 3x10  Purp 3x12  purp np 3x12  Purp    SLS w/alpha      2x 2X np     Ab bracing with hip flexion       3x15 np 3x12        Assessment: Tolerated treatment well   Patient demonstrated fatigue post treatment, exhibited good technique with therapeutic exercises and would benefit from continued PT  Pt notes less tightness in her hip post PT session  Plan: Continue per plan of care

## 2019-01-22 ENCOUNTER — OFFICE VISIT (OUTPATIENT)
Dept: PHYSICAL THERAPY | Facility: MEDICAL CENTER | Age: 24
End: 2019-01-22
Payer: COMMERCIAL

## 2019-01-22 DIAGNOSIS — M25.551 RIGHT HIP PAIN: Primary | ICD-10-CM

## 2019-01-22 PROCEDURE — 97140 MANUAL THERAPY 1/> REGIONS: CPT

## 2019-01-22 PROCEDURE — 97110 THERAPEUTIC EXERCISES: CPT

## 2019-01-22 PROCEDURE — 97112 NEUROMUSCULAR REEDUCATION: CPT

## 2019-01-22 NOTE — PROGRESS NOTES
Daily Note     Today's date: 2019  Patient name: Jaxon Matthew  : 1995  MRN: [id number]  Referring provider: Alfredo Marquez  Dx:   Encounter Diagnosis     ICD-10-CM    1   Right hip pain M25 551                   Subjective: Pt reports that her hip still gets sore, but mainly feels as though it needs to "crack"       Objective: See treatment diary below    Daily Treatment Diary     Manual  12/12 12/19 12/28 1/3 1/4 1/7 1/10 1/15 1/17 1/22   STM/MFR   AZ           Manual hip flex str  AZ           PROM R hip   HK HK KO KO HK KO KO KO   Long axis distraction R LE    HK np KO HK np KO KO                    Exercise Diary    12/28 1/3 1/4 1/7 1/10 1/15 1/17 1/22   Bike   10' 10' 10' 10' 10' 10' 10' 10'   Bridges 2x10 5s hold ball 3x10 5s hold ball x30 x40 x40 x40 x40 D/C     Clams 30x 5s hold sup 30x 5s hold sup 3x10 3x15 3x15 3x15 3x15 np 3x12 3x12   Prone glute set 10x 5s hold 10x 5s hold 5"  x30  supine 5"  x30 5"  x30 5"  x30  supine 5"  x30 np 5"  x30 5"  x30   Prone quad str 3x30s 3x30s 3x30" 3x30" 3x30" 3x30" 3x30" manua manua manua   Hip flex str EOT  3x30s Stand  3x30" 3x30" 3x30" 3x30" 3x30" 3x30" 3x30" Stand  3x30   Ab bracing   5"  x30 With hip flexion  3x10   W/hip  Flexion  x30 w/bridge &   Hip  Flex  2x10 3x10 np 3x10 Held   Reverse clam shells   3x10 3x15 3x15 3x15 3x15 np 3x12 3x12   Prone hip ext   3x10 3x12 3x12 3x12 3x15 np 3x15 3x15   Glute raises   3x10 3x12 3x12 3x12 3x15 np 3x15 3x15   Piriformis str   3x30" 3x30" 3x30" 3x30" 3x30" np 3x30" 3x30"   Add sq   5"  x30 With Br  3x10 W/  Bridge  3x10 W/  Bridge  3x10 3x12 np W/  Bridge  3x10 W/  Bridge  3x10   Standing hip abd    3x10 3x10 3x15 3x15 np 3x15 3x15   Stool IR/ER    x30 x30 x30 3x12 3x12 3x12 3x12   Bridging with t-band abd    3x10  purp 3x10  Purp 3x10  Purp 3x12  purp np 3x12  Purp Purp  3x15   SLS w/alpha      2x 2X np  np   Ab bracing with hip flexion       3x15 np 3x12   np       Assessment: Tolerated treatment well  Patient demonstrated fatigue post treatment, exhibited good technique with therapeutic exercises and would benefit from continued PT  Pt felt some discomfort in her R groin after performing R hip PROM and going into flexion  Held bridges with knee extension, due to discomfort when WB on R LE  Concluded MHP to R hip w/some relief and decreased pain post         Plan: Continue per plan of care

## 2019-01-24 ENCOUNTER — OFFICE VISIT (OUTPATIENT)
Dept: PHYSICAL THERAPY | Facility: MEDICAL CENTER | Age: 24
End: 2019-01-24
Payer: COMMERCIAL

## 2019-01-24 DIAGNOSIS — M25.551 RIGHT HIP PAIN: Primary | ICD-10-CM

## 2019-01-24 PROCEDURE — 97112 NEUROMUSCULAR REEDUCATION: CPT | Performed by: PHYSICAL THERAPIST

## 2019-01-24 PROCEDURE — 97140 MANUAL THERAPY 1/> REGIONS: CPT | Performed by: PHYSICAL THERAPIST

## 2019-01-24 PROCEDURE — 97110 THERAPEUTIC EXERCISES: CPT | Performed by: PHYSICAL THERAPIST

## 2019-01-24 NOTE — PROGRESS NOTES
Daily Note     Today's date: 2019  Patient name: Abelardo Sinclair  : 1995  MRN: [id number]  Referring provider: Hany Cheng  Dx:   Encounter Diagnosis     ICD-10-CM    1  Right hip pain M25 551             Subjective: Pt reports that her anterior hip has been painful since her last visit  Pt complains of constant pain, which worsens with certain movements  Objective: See treatment diary below    + TTP R rectus femoris    PROM R hip is WNL, + pain at end range IR    All exercises performed in a pain free ROM  Assessment: Tolerated treatment well  Patient demonstrated fatigue post treatment, exhibited good technique with therapeutic exercises and would benefit from continued PT  Pt did well with modification of her TE program and had minimal c/o pain post tx  Pts current sxs are likely secondary to hip flexor irritation  Pt must exercises and keep activity level at a pain free level  Plan: Continue per plan of care         Daily Treatment Diary     Manual              Lateral hip mobs grade III HK            Manual hip flex str             PROM R hip HK            Long axis distraction R LE HK            Manual hip flexor str 4x30"                Exercise Diary              Bike 10'            Clams 3x10            Bridging 3x30            Glute set 5"  x30            Prone quad str             Hip flex str EOT             Ab bracing 5"  x30            Reverse clam shells 3x10            Prone hip ext 3x10            Glute raises 3x10            Piriformis str             Add sq With BR  3x10            Standing hip abd 3x10            Stool IR/ER x30            Bridging with t-band abd purp  3x10              SLS w/alpha NP            Ab bracing with hip flexion NP

## 2019-01-29 ENCOUNTER — APPOINTMENT (OUTPATIENT)
Dept: PHYSICAL THERAPY | Facility: MEDICAL CENTER | Age: 24
End: 2019-01-29
Payer: COMMERCIAL

## 2019-01-31 ENCOUNTER — APPOINTMENT (OUTPATIENT)
Dept: PHYSICAL THERAPY | Facility: MEDICAL CENTER | Age: 24
End: 2019-01-31
Payer: COMMERCIAL

## 2019-02-01 ENCOUNTER — OFFICE VISIT (OUTPATIENT)
Dept: PHYSICAL THERAPY | Facility: MEDICAL CENTER | Age: 24
End: 2019-02-01
Payer: COMMERCIAL

## 2019-02-01 DIAGNOSIS — M25.551 RIGHT HIP PAIN: Primary | ICD-10-CM

## 2019-02-01 PROCEDURE — 97112 NEUROMUSCULAR REEDUCATION: CPT

## 2019-02-01 PROCEDURE — 97110 THERAPEUTIC EXERCISES: CPT

## 2019-02-01 PROCEDURE — 97140 MANUAL THERAPY 1/> REGIONS: CPT

## 2019-02-01 NOTE — PROGRESS NOTES
Daily Note     Today's date: 2019  Patient name: Mary Arvizu  : 1995  MRN: [id number]  Referring provider: Ky Tavarez*  Dx:   Encounter Diagnosis     ICD-10-CM    1  Right hip pain M25 551                   Subjective: Pt reports that her hip is feeling better and has been performing her ex's and daily activities within a pain-free range  Pt also states that she is walking better and without pain  Objective: See treatment diary below    Daily Treatment Diary     Manual             Lateral hip mobs grade III HK            Manual hip flex str             PROM R hip HK KO           Long axis distraction R LE HK KO           Manual hip flexor str 4x30" KO               Exercise Diary             Bike 10' 10'           Clams 3x10 3x10           Bridging 3x30 3x10           Glute set 5"  x30 5"  x30           Prone quad str             Hip flex str EOT             Ab bracing 5"  x30 5"  x30           Reverse clam shells 3x10 3x10           Prone hip ext 3x10 3x10           Glute raises 3x10 3x10           Piriformis str             Add sq With BR  3x10 With  Br  3x10           Standing hip abd 3x10 3x10           Stool IR/ER x30 x30           Bridging with t-band abd purp  3x10   purp  3x10           SLS w/alpha NP NP           Ab bracing with hip flexion NP NP             MHP in L SL  Post PT session - 15 min    Assessment: Tolerated treatment well  Patient demonstrated fatigue post treatment, exhibited good technique with therapeutic exercises and would benefit from continued PT  Pt performed all ex's within a pain-free range and did not complain of any recurring sx's w/ex's  Slight discomfort when going into passive flexion w/manuals, so this was held  Pt cont to state that her hip feels as though it needs to "crack" with ER and IR movement  Concluded w/MHP  Plan: Continue per plan of care

## 2019-02-05 ENCOUNTER — OFFICE VISIT (OUTPATIENT)
Dept: PHYSICAL THERAPY | Facility: MEDICAL CENTER | Age: 24
End: 2019-02-05
Payer: COMMERCIAL

## 2019-02-05 DIAGNOSIS — M25.551 RIGHT HIP PAIN: Primary | ICD-10-CM

## 2019-02-05 PROCEDURE — 97140 MANUAL THERAPY 1/> REGIONS: CPT

## 2019-02-05 PROCEDURE — 97112 NEUROMUSCULAR REEDUCATION: CPT

## 2019-02-05 PROCEDURE — 97110 THERAPEUTIC EXERCISES: CPT

## 2019-02-07 ENCOUNTER — TRANSCRIBE ORDERS (OUTPATIENT)
Dept: PHYSICAL THERAPY | Facility: MEDICAL CENTER | Age: 24
End: 2019-02-07

## 2019-02-07 ENCOUNTER — OFFICE VISIT (OUTPATIENT)
Dept: PHYSICAL THERAPY | Facility: MEDICAL CENTER | Age: 24
End: 2019-02-07
Payer: COMMERCIAL

## 2019-02-07 DIAGNOSIS — M25.551 RIGHT HIP PAIN: Primary | ICD-10-CM

## 2019-02-07 PROCEDURE — 97112 NEUROMUSCULAR REEDUCATION: CPT

## 2019-02-07 PROCEDURE — 97110 THERAPEUTIC EXERCISES: CPT

## 2019-02-07 PROCEDURE — 97140 MANUAL THERAPY 1/> REGIONS: CPT

## 2019-02-07 NOTE — LETTER
2019    Nj Galeano MD  [address] [place] [address]    Patient: Justyna Tristan   YOB: 1995   Date of Visit: 2019     Encounter Diagnosis     ICD-10-CM    1  Right hip pain M25 551        Dear Dr Jo Wilkerson:    Please review the attached Plan of Care from [facility] recent visit  Please verify that you agree therapy should continue by signing the attached document and sending it back to our office  If you have any questions or concerns, please don't hesitate to call  Sincerely,    Melodie Hartley, PT      Referring Provider:      I certify that I have read the below Plan of Care and certify the need for these services furnished under this plan of treatment while under my care  Nj Galeano MD  [address] [place] [address]: [phone]          Daily Note     Today's date: 2019  Patient name: Justyna Tristan  : 1995  MRN: [id number]  Referring provider: Donovan Price*  Dx:   Encounter Diagnosis     ICD-10-CM    1  Right hip pain M25 551                   Subjective: Pt reports that her hip is feeling good, but mainly has discomfort when bringing her hip into flexion         Objective: See treatment diary below  Daily Treatment Diary     Manual           Lateral hip mobs grade III HK            Manual hip flex str             PROM R hip HK KO KO KO         Long axis distraction R LE HK KO KO KO         Manual hip flexor str 4x30" KO KO KO             Exercise Diary           Bike 10' 10' 10' 10'         Clams 3x10 3x10 3x10 3x10         Bridging 3x30 3x10 3x10 3x10         Glute set 5"  x30 5"  x30 5"  x30 5"  x30         Prone quad str   Man Man         Hip flex str EOT   NP          Ab bracing 5"  x30 5"  x30 5"  x30 5"  x30         Reverse clam shells 3x10 3x10 3x10 3x10         Prone hip ext 3x10 3x10 3x10 3x10         Glute raises 3x10 3x10 3x10 3x10 Piriformis str    np         Add sq With BR  3x10 With  Br  3x10 With  Br  3x10 With  Br  3x10         Standing hip abd 3x10 3x10 3x10          Stool IR/ER x30 x30 x30 x30         Bridging with t-band abd purp  3x10   purp  3x10 NP Purp  3x10         SLS w/alpha NP NP NP np         Ab bracing with hip flexion NP NP NP np           MHP at end of session  Post PT session - 15 min      Assessment: Tolerated treatment well  Patient demonstrated fatigue post treatment, exhibited good technique with therapeutic exercises and would benefit from continued PT  Performed all ex's within pt's tolerance, avoiding flexion  Plan: Continue per plan of care  PT Re-Evaluation     Today's date: 2018  Patient name: Ehsan Hallman  : 1995  MRN: [id number]  Referring provider: Pura Silva  Dx: R hip pain  S/p R hip arthroscopy-labral tear         Assessment  Assessment details: Ehsan Hallman is a 26 y o  y/o female who presents s/p R hip labral repair  Patient is making steady progress toward achieving her established goals  Patient presents with right hip extensibility deficits and hip accessory weakness throughout  Pt  will benefit from skilled PT services that includes manual therapy techniques to enhance tissue extensibility, neuromuscular re-education to facilitate motor control, therapeutic exercise to increase functional mobility, and modalities prn to reduce pain and inflammation  Impairments: abnormal coordination, abnormal gait, abnormal muscle firing, abnormal muscle tone, abnormal or restricted ROM, abnormal movement, activity intolerance, impaired balance, impaired physical strength, lacks appropriate home exercise program, pain with function, weight-bearing intolerance and poor body mechanics  Barriers to therapy: Post operative fall and possible re-injury, as well as non-compliance with weight bearing and hip precautions    Understanding of Dx/Px/POC: good Prognosis: good    Goals  Impairment Goals  - Pt I with initial HEP in 1-2 visits  Met  - Improve ROM equal to contralateral side in 4-6 weeks  Not met  - Increase strength to 5/5 in all affected areas in 4-6 weeks  Not met    Functional Goals  - Increase Functional Status Measure to: 60 in 6-8 weeks  Not met   - Patient will be independent with comprehensive HEP in 6-8 weeks  Not met  - Ambulation is improved to prior level of function in 6-8 weeks   Not met   - Stair climbing is improved to prior level of function in 6-8 weeks  Not met  - Squatting is improved to prior level of function in 6-8 weeks Not met    Plan  Patient would benefit from: Continued physical therapy  Planned modality interventions: cryotherapy and thermotherapy: hydrocollator packs  Planned therapy interventions: joint mobilization, manual therapy, neuromuscular re-education, patient education, postural training, strengthening, stretching, therapeutic exercise, graded exercise, home exercise program, activity modification, ADL retraining, balance/weight bearing training, behavior modification, flexibility, functional ROM exercises and body mechanics training  Frequency: 2x week  Duration in weeks: 8  Treatment plan discussed with: patient      Subjective Evaluation    History of Present Illness  Date of surgery: 18  Mechanism of injury: Patient reports that she had a R hip labral repair by Dr Jo Wilkerson on 18 and d/c'd crutches 2 weeks post operatively  She is no longer ambulating with an AD  Immediately after sx, patient reports falling on her right hip on 18  Patient states that she has followed up with Dr Jo Wilkerson and will be following up again on 2018 because she has been in significant pain since the fall  Pain  Current pain ratin  At best pain ratin  At worst pain ratin  Quality: tight and pulling  Alleviating factors: Tylenol      Treatments  Current treatment: physical therapy  Patient Goals  Patient goals for therapy: decreased pain, increased motion, improved balance, increased strength, independence with ADLs/IADLs and return to sport/leisure activities  Patient goal: Weight lifting     Subjective update:    Pt reports that her anterior hip pain is improving  Her activity tolerance is slowly improving  Objective       Tenderness     Right Hip     + TTP R hip flexor    Lumbar Screen  Lumbar range of motion within normal limits      Neurological Testing     Sensation     Hip   Left Hip   Intact: light touch    Right Hip   Intact: light touch    Active Range of Motion   Left Hip   Flexion: 120 degrees   Extension: 12 degrees   Abduction: 35 degrees   Adduction: 30 degrees   External rotation (prone): 45 degrees   Internal rotation (prone): 55 degrees     Right Hip   Flexion: 110 degrees   Extension: 12 degrees   Abduction: 30 degrees   Adduction: 30   External rotation (prone): 40 degrees   Internal rotation (prone): 45    Strength/Myotome Testing     Left Hip   Normal muscle strength    Right Hip   Planes of Motion   Flexion: 4  Extension: 4  Abduction: 4  Right hip adduction strength: 4    Isolated Muscles   Gluteus maximums: 4  Gluteus medius: 4  TFL: 4  Iliopsoas: 4

## 2019-02-07 NOTE — PROGRESS NOTES
Daily Note     Today's date: 2019  Patient name: Abelardo Sinclair  : 1995  MRN: [id number]  Referring provider: Hany Cheng*  Dx:   Encounter Diagnosis     ICD-10-CM    1  Right hip pain M25 551                   Subjective: Pt reports that her hip is feeling good, but mainly has discomfort when bringing her hip into flexion  Objective: See treatment diary below  Daily Treatment Diary     Manual           Lateral hip mobs grade III HK            Manual hip flex str             PROM R hip HK KO KO KO         Long axis distraction R LE HK KO KO KO         Manual hip flexor str 4x30" KO KO KO             Exercise Diary           Bike 10' 10' 10' 10'         Clams 3x10 3x10 3x10 3x10         Bridging 3x30 3x10 3x10 3x10         Glute set 5"  x30 5"  x30 5"  x30 5"  x30         Prone quad str   Man Man         Hip flex str EOT   NP          Ab bracing 5"  x30 5"  x30 5"  x30 5"  x30         Reverse clam shells 3x10 3x10 3x10 3x10         Prone hip ext 3x10 3x10 3x10 3x10         Glute raises 3x10 3x10 3x10 3x10         Piriformis str    np         Add sq With BR  3x10 With  Br  3x10 With  Br  3x10 With  Br  3x10         Standing hip abd 3x10 3x10 3x10          Stool IR/ER x30 x30 x30 x30         Bridging with t-band abd purp  3x10   purp  3x10 NP Purp  3x10         SLS w/alpha NP NP NP np         Ab bracing with hip flexion NP NP NP np           MHP at end of session  Post PT session - 15 min      Assessment: Tolerated treatment well  Patient demonstrated fatigue post treatment, exhibited good technique with therapeutic exercises and would benefit from continued PT  Performed all ex's within pt's tolerance, avoiding flexion  Plan: Continue per plan of care

## 2019-02-07 NOTE — PROGRESS NOTES
PT Re-Evaluation     Today's date: 2018  Patient name: Homer Ceron  : 1995  MRN: [id number]  Referring provider: Fox Lopez*  Dx: R hip pain  S/p R hip arthroscopy-labral tear         Assessment  Assessment details: Homer Ceron is a 26 y o  y/o female who presents s/p R hip labral repair  Patient is making steady progress toward achieving her established goals  Patient presents with right hip extensibility deficits and hip accessory weakness throughout  Pt  will benefit from skilled PT services that includes manual therapy techniques to enhance tissue extensibility, neuromuscular re-education to facilitate motor control, therapeutic exercise to increase functional mobility, and modalities prn to reduce pain and inflammation  Impairments: abnormal coordination, abnormal gait, abnormal muscle firing, abnormal muscle tone, abnormal or restricted ROM, abnormal movement, activity intolerance, impaired balance, impaired physical strength, lacks appropriate home exercise program, pain with function, weight-bearing intolerance and poor body mechanics  Barriers to therapy: Post operative fall and possible re-injury, as well as non-compliance with weight bearing and hip precautions    Understanding of Dx/Px/POC: good   Prognosis: good    Goals  Impairment Goals  - Pt I with initial HEP in 1-2 visits  Met  - Improve ROM equal to contralateral side in 4-6 weeks  Not met  - Increase strength to 5/5 in all affected areas in 4-6 weeks  Not met    Functional Goals  - Increase Functional Status Measure to: 60 in 6-8 weeks  Not met   - Patient will be independent with comprehensive HEP in 6-8 weeks  Not met  - Ambulation is improved to prior level of function in 6-8 weeks   Not met   - Stair climbing is improved to prior level of function in 6-8 weeks  Not met  - Squatting is improved to prior level of function in 6-8 weeks Not met    Plan  Patient would benefit from: Continued physical therapy  Planned modality interventions: cryotherapy and thermotherapy: hydrocollator packs  Planned therapy interventions: joint mobilization, manual therapy, neuromuscular re-education, patient education, postural training, strengthening, stretching, therapeutic exercise, graded exercise, home exercise program, activity modification, ADL retraining, balance/weight bearing training, behavior modification, flexibility, functional ROM exercises and body mechanics training  Frequency: 2x week  Duration in weeks: 8  Treatment plan discussed with: patient      Subjective Evaluation    History of Present Illness  Date of surgery: 18  Mechanism of injury: Patient reports that she had a R hip labral repair by Dr Bell Mcneil on 18 and d/c'd crutches 2 weeks post operatively  She is no longer ambulating with an AD  Immediately after sx, patient reports falling on her right hip on 18  Patient states that she has followed up with Dr Bell Mcneil and will be following up again on 2018 because she has been in significant pain since the fall  Pain  Current pain ratin  At best pain ratin  At worst pain ratin  Quality: tight and pulling  Alleviating factors: Tylenol  Treatments  Current treatment: physical therapy  Patient Goals  Patient goals for therapy: decreased pain, increased motion, improved balance, increased strength, independence with ADLs/IADLs and return to sport/leisure activities  Patient goal: Weight lifting     Subjective update:    Pt reports that her anterior hip pain is improving  Her activity tolerance is slowly improving  Objective       Tenderness     Right Hip     + TTP R hip flexor    Lumbar Screen  Lumbar range of motion within normal limits      Neurological Testing     Sensation     Hip   Left Hip   Intact: light touch    Right Hip   Intact: light touch    Active Range of Motion   Left Hip   Flexion: 120 degrees   Extension: 12 degrees Abduction: 35 degrees   Adduction: 30 degrees   External rotation (prone): 45 degrees   Internal rotation (prone): 55 degrees     Right Hip   Flexion: 110 degrees   Extension: 12 degrees   Abduction: 30 degrees   Adduction: 30   External rotation (prone): 40 degrees   Internal rotation (prone): 45    Strength/Myotome Testing     Left Hip   Normal muscle strength    Right Hip   Planes of Motion   Flexion: 4  Extension: 4  Abduction: 4  Right hip adduction strength: 4    Isolated Muscles   Gluteus maximums: 4  Gluteus medius: 4  TFL: 4  Iliopsoas: 4

## 2019-02-11 ENCOUNTER — OFFICE VISIT (OUTPATIENT)
Dept: PHYSICAL THERAPY | Facility: MEDICAL CENTER | Age: 24
End: 2019-02-11
Payer: COMMERCIAL

## 2019-02-11 DIAGNOSIS — M25.551 RIGHT HIP PAIN: Primary | ICD-10-CM

## 2019-02-11 PROCEDURE — 97140 MANUAL THERAPY 1/> REGIONS: CPT | Performed by: PHYSICAL THERAPY ASSISTANT

## 2019-02-11 PROCEDURE — 97110 THERAPEUTIC EXERCISES: CPT | Performed by: PHYSICAL THERAPY ASSISTANT

## 2019-02-14 ENCOUNTER — OFFICE VISIT (OUTPATIENT)
Dept: PHYSICAL THERAPY | Facility: MEDICAL CENTER | Age: 24
End: 2019-02-14
Payer: COMMERCIAL

## 2019-02-14 DIAGNOSIS — M25.551 RIGHT HIP PAIN: Primary | ICD-10-CM

## 2019-02-14 PROCEDURE — 97140 MANUAL THERAPY 1/> REGIONS: CPT

## 2019-02-14 PROCEDURE — 97110 THERAPEUTIC EXERCISES: CPT

## 2019-02-14 NOTE — PROGRESS NOTES
Daily Note     Today's date: 2019  Patient name: Mg Leon  : 1995  MRN: [id number]  Referring provider: Becky Youngblood  Dx:   Encounter Diagnosis     ICD-10-CM    1  Right hip pain M25 551                   Subjective: Pt reports that her hip continues to be bothersome when going into a flexed position  Objective: See treatment diary below  Daily Treatment Diary     Manual         Lateral hip mobs grade III HK            HPL to R hip flexor  Sprain/strain  protocol      KO       PROM R hip HK KO KO KO RK KO       Long axis distraction R LE HK KO KO KO RK KO       Manual hip flexor str 4x30" KO KO KO RK KO           Exercise Diary         Bike 10' 10' 10' 10' 10' 10'       Clams 3x10 3x10 3x10 3x10 3x10 3x10       Bridging 3x30 3x10 3x10 3x10 3x10 3x10       Glute set 5"  x30 5"  x30 5"  x30 5"  x30 5"  x30 5"  x30       Prone quad str   Man Man         Hip flex str EOT   NP            Ab bracing 5"  x30 5"  x30 5"  x30 5"  x30 5"x30 5"  x30       Reverse clam shells 3x10 3x10 3x10 3x10 3x10 3x10       Prone hip ext 3x10 3x10 3x10 3x10 3x10 3x10       Glute raises 3x10 3x10 3x10 3x10 3x10 3x10       Piriformis str    np         Add sq With BR  3x10 With  Br  3x10 With  Br  3x10 With  Br  3x10 With BR  3x10 With  BR  3x10       Standing hip abd 3x10 3x10 3x10   3x10       Stool IR/ER x30 x30 x30 x30 x30 x30       Bridging with t-band abd purp  3x10   purp  3x10 NP Purp  3x10 purp  3x10 Blue  3x10       SLS w/alpha NP NP NP np  np       Ab bracing with hip flexion NP NP NP np  np           Post PT session - 15 min- NP        Assessment: Tolerated treatment well  Patient demonstrated fatigue post treatment, exhibited good technique with therapeutic exercises and would benefit from continued PT  Trialed HPL to hip flexor - assess at NV  Plan: Continue per plan of care

## 2019-02-19 ENCOUNTER — APPOINTMENT (OUTPATIENT)
Dept: PHYSICAL THERAPY | Facility: MEDICAL CENTER | Age: 24
End: 2019-02-19
Payer: COMMERCIAL

## 2019-02-21 ENCOUNTER — OFFICE VISIT (OUTPATIENT)
Dept: PHYSICAL THERAPY | Facility: MEDICAL CENTER | Age: 24
End: 2019-02-21
Payer: COMMERCIAL

## 2019-02-21 DIAGNOSIS — M25.551 RIGHT HIP PAIN: Primary | ICD-10-CM

## 2019-02-21 PROCEDURE — 97110 THERAPEUTIC EXERCISES: CPT

## 2019-02-21 PROCEDURE — 97140 MANUAL THERAPY 1/> REGIONS: CPT

## 2019-02-21 NOTE — PROGRESS NOTES
Daily Note     Today's date: 2019  Patient name: Nabor Brar  : 1995  MRN: [id number]  Referring provider: Som Mckeon  Dx:   Encounter Diagnosis     ICD-10-CM    1  Right hip pain M25 551                   Subjective: Pt did not notice any changes after HPL tx performed at   Pt cont to have discomfort with active hip flexion  Objective: See treatment diary below    Daily Treatment Diary     Manual        Lateral hip mobs grade III HK            HPL to R hip flexor  Sprain/strain  protocol      KO KO      PROM R hip HK KO KO KO RK KO KO      Long axis distraction R LE HK KO KO KO RK KO       Manual hip flexor str 4x30" KO KO KO RK KO           Exercise Diary        Bike 10' 10' 10' 10' 10' 10' 10'      Clams 3x10 3x10 3x10 3x10 3x10 3x10 3x10      Bridging 3x30 3x10 3x10 3x10 3x10 3x10 3x10      Glute set 5"  x30 5"  x30 5"  x30 5"  x30 5"  x30 5"  x30 5"  x30      Prone quad str   Man Man         Hip flex str EOT   NP            Ab bracing 5"  x30 5"  x30 5"  x30 5"  x30 5"x30 5"  x30 5"  x30      Reverse clam shells 3x10 3x10 3x10 3x10 3x10 3x10 3x10      Prone hip ext 3x10 3x10 3x10 3x10 3x10 3x10 3x10      Glute raises 3x10 3x10 3x10 3x10 3x10 3x10 3x10      Piriformis str    np         Add sq With BR  3x10 With  Br  3x10 With  Br  3x10 With  Br  3x10 With BR  3x10 With  BR  3x10 W/  BR  3x10      Standing hip abd 3x10 3x10 3x10   3x10 3x10      Stool IR/ER x30 x30 x30 x30 x30 x30 x30      Bridging with t-band abd purp  3x10   purp  3x10 NP Purp  3x10 purp  3x10 Blue  3x10 Blue  3x10      SLS w/alpha NP NP NP np  np np      Ab bracing with hip flexion NP NP NP np  np np          Post PT session - 15 min- NP        Assessment: Tolerated treatment well   Patient demonstrated fatigue post treatment, exhibited good technique with therapeutic exercises and would benefit from continued PT        Plan: Continue per plan of care

## 2019-02-26 ENCOUNTER — APPOINTMENT (OUTPATIENT)
Dept: PHYSICAL THERAPY | Facility: MEDICAL CENTER | Age: 24
End: 2019-02-26
Payer: COMMERCIAL

## 2019-02-28 ENCOUNTER — OFFICE VISIT (OUTPATIENT)
Dept: PHYSICAL THERAPY | Facility: MEDICAL CENTER | Age: 24
End: 2019-02-28
Payer: COMMERCIAL

## 2019-02-28 DIAGNOSIS — M25.551 RIGHT HIP PAIN: Primary | ICD-10-CM

## 2019-02-28 PROCEDURE — 97140 MANUAL THERAPY 1/> REGIONS: CPT

## 2019-02-28 PROCEDURE — 97110 THERAPEUTIC EXERCISES: CPT

## 2019-02-28 NOTE — PROGRESS NOTES
Daily Note     Today's date: 2019  Patient name: Jamison Leon  : 1995  MRN: [id number]  Referring provider: Tj Norton  Dx:   Encounter Diagnosis     ICD-10-CM    1  Right hip pain M25 551                   Subjective: Pt reports that she believes the laser treatments are helping her and feels less tightness in her R hip  Objective: See treatment diary below  Daily Treatment Diary     Manual       Lateral hip mobs grade III HK            HPL to R hip flexor  Sprain/strain  protocol      KO KO KO     PROM R hip HK KO KO KO RK KO KO KO     Long axis distraction R LE HK KO KO KO RK KO       Manual hip flexor str 4x30" KO KO KO RK KO  KO         Exercise Diary       Bike 10' 10' 10' 10' 10' 10' 10' 10'     Clams 3x10 3x10 3x10 3x10 3x10 3x10 3x10 np     Bridging 3x30 3x10 3x10 3x10 3x10 3x10 3x10 np     Glute set 5"  x30 5"  x30 5"  x30 5"  x30 5"  x30 5"  x30 5"  x30 np     Prone quad str   Man Man    man     Hip flex str EOT   NP            Ab bracing 5"  x30 5"  x30 5"  x30 5"  x30 5"x30 5"  x30 5"  x30 np     Reverse clam shells 3x10 3x10 3x10 3x10 3x10 3x10 3x10 np     Prone hip ext 3x10 3x10 3x10 3x10 3x10 3x10 3x10 np     Glute raises 3x10 3x10 3x10 3x10 3x10 3x10 3x10 np     Piriformis str    np         Add sq With BR  3x10 With  Br  3x10 With  Br  3x10 With  Br  3x10 With BR  3x10 With  BR  3x10 W/  BR  3x10 np     Standing hip abd 3x10 3x10 3x10   3x10 3x10 np     Stool IR/ER x30 x30 x30 x30 x30 x30 x30 x30     Bridging with t-band abd purp  3x10   purp  3x10 NP Purp  3x10 purp  3x10 Blue  3x10 Blue  3x10 np     SLS w/alpha NP NP NP np  np np np     Ab bracing with hip flexion NP NP NP np  np np np         Post PT session - 15 min- NP    Assessment: Tolerated treatment well  Patient would benefit from continued PT  Pt responding well to manual therapy and HPL therapy    Pt did not complete her ex's today 2* time constraints  Pt to complete at home  Plan: Continue per plan of care

## 2019-03-04 ENCOUNTER — OFFICE VISIT (OUTPATIENT)
Dept: PHYSICAL THERAPY | Facility: MEDICAL CENTER | Age: 24
End: 2019-03-04
Payer: COMMERCIAL

## 2019-03-04 DIAGNOSIS — M25.551 RIGHT HIP PAIN: Primary | ICD-10-CM

## 2019-03-04 PROCEDURE — 97112 NEUROMUSCULAR REEDUCATION: CPT

## 2019-03-04 PROCEDURE — 97110 THERAPEUTIC EXERCISES: CPT

## 2019-03-04 PROCEDURE — 97140 MANUAL THERAPY 1/> REGIONS: CPT

## 2019-03-04 NOTE — PROGRESS NOTES
Daily Note     Today's date: 3/4/2019  Patient name: Arianna Sidhu  : 1995  MRN: [id number]  Referring provider: Lazara Murray  Dx:   Encounter Diagnosis     ICD-10-CM    1  Right hip pain M25 551                   Subjective: Pt states that the HPL tx's are helping and is experiencing less tightness in her R hip  Objective: See treatment diary below    Daily Treatment Diary     Manual   3/4    Lateral hip mobs grade III HK            HPL to R hip flexor  Sprain/strain  protocol      KO KO KO KO    PROM R hip HK KO KO KO RK KO KO KO KO    Long axis distraction R LE HK KO KO KO RK KO   KO  HK-hip  mobs    Manual hip flexor str 4x30" KO KO KO RK KO  KO         Exercise Diary   3/4    Bike 10' 10' 10' 10' 10' 10' 10' 10' 10'    Clams 3x10 3x10 3x10 3x10 3x10 3x10 3x10 np 3x10    Bridging 3x30 3x10 3x10 3x10 3x10 3x10 3x10 np 3x10    Glute set 5"  x30 5"  x30 5"  x30 5"  x30 5"  x30 5"  x30 5"  x30 np 5"  x30    Prone quad str   Man Man    man     Hip flex str EOT   NP            Ab bracing 5"  x30 5"  x30 5"  x30 5"  x30 5"x30 5"  x30 5"  x30 np 5"  x30    Reverse clam shells 3x10 3x10 3x10 3x10 3x10 3x10 3x10 np 3x10    Prone hip ext 3x10 3x10 3x10 3x10 3x10 3x10 3x10 np 3x10    Glute raises 3x10 3x10 3x10 3x10 3x10 3x10 3x10 np 3x10    Piriformis str    np         Add sq With BR  3x10 With  Br  3x10 With  Br  3x10 With  Br  3x10 With BR  3x10 With  BR  3x10 W/  BR  3x10 np W/  brdg  3x10    Standing hip abd 3x10 3x10 3x10   3x10 3x10 np 3x10    Stool IR/ER x30 x30 x30 x30 x30 x30 x30 x30 x30    Bridging with t-band abd purp  3x10   purp  3x10 NP Purp  3x10 purp  3x10 Blue  3x10 Blue  3x10 np Purp  3x10    SLS w/alpha NP NP NP np  np np np hep    Ab bracing with hip flexion NP NP NP np  np np np np        Post PT session - 15 min- NP    Assessment: Tolerated treatment well   Patient demonstrated fatigue post treatment, exhibited good technique with therapeutic exercises and would benefit from continued PT  Pt felt relief after hip mobilization performed by PT, HK  Plan: Continue per plan of care

## 2019-03-07 ENCOUNTER — OFFICE VISIT (OUTPATIENT)
Dept: PHYSICAL THERAPY | Facility: MEDICAL CENTER | Age: 24
End: 2019-03-07
Payer: COMMERCIAL

## 2019-03-07 DIAGNOSIS — M25.551 RIGHT HIP PAIN: Primary | ICD-10-CM

## 2019-03-07 PROCEDURE — 97110 THERAPEUTIC EXERCISES: CPT

## 2019-03-07 PROCEDURE — 97112 NEUROMUSCULAR REEDUCATION: CPT

## 2019-03-07 PROCEDURE — 97140 MANUAL THERAPY 1/> REGIONS: CPT

## 2019-03-07 NOTE — PROGRESS NOTES
Daily Note     Today's date: 3/7/2019  Patient name: Jamison Leon  : 1995  MRN: [id number]  Referring provider: Tj Norton*  Dx:   Encounter Diagnosis     ICD-10-CM    1  Right hip pain M25 551                   Subjective: Pt reports that she experienced some R hip soreness after mob performed at          Objective: See treatment diary below  Daily Treatment Diary     Manual  1/24 2/1 2/5 2/7 2/11 2/14 2/21 2/28 3/4 3/7   Lateral hip mobs grade III HK            HPL to R hip flexor  Sprain/strain  protocol      KO KO KO KO KO   PROM R hip HK KO KO KO RK KO KO KO KO KO   Long axis distraction R LE HK KO KO KO RK KO   KO  HK-hip  mobs KO   Manual hip flexor str 4x30" KO KO KO RK KO  KO         Exercise Diary  1/24 2/1 2/5 2/7 2/11 2/14 2/21 2/28 3/4 3/7   Bike 10' 10' 10' 10' 10' 10' 10' 10' 10' 10'   Clams 3x10 3x10 3x10 3x10 3x10 3x10 3x10 np 3x10 L2  3x10   Bridging 3x30 3x10 3x10 3x10 3x10 3x10 3x10 np 3x10 3x10   Glute set 5"  x30 5"  x30 5"  x30 5"  x30 5"  x30 5"  x30 5"  x30 np 5"  x30 5"  x30   Prone quad str   Man Man    man  30"x3   Hip flex str EOT   NP            Ab bracing 5"  x30 5"  x30 5"  x30 5"  x30 5"x30 5"  x30 5"  x30 np 5"  x30 np   Reverse clam shells 3x10 3x10 3x10 3x10 3x10 3x10 3x10 np 3x10 L2  3x10   Prone hip ext 3x10 3x10 3x10 3x10 3x10 3x10 3x10 np 3x10 np   Glute raises 3x10 3x10 3x10 3x10 3x10 3x10 3x10 np 3x10 np   Piriformis str    np      30"x3   Add sq With BR  3x10 With  Br  3x10 With  Br  3x10 With  Br  3x10 With BR  3x10 With  BR  3x10 W/  BR  3x10 np W/  brdg  3x10 np   Standing hip abd 3x10 3x10 3x10   3x10 3x10 np 3x10 2#  3x10   Stool IR/ER x30 x30 x30 x30 x30 x30 x30 x30 x30    Bridging with t-band abd purp  3x10   purp  3x10 NP Purp  3x10 purp  3x10 Blue  3x10 Blue  3x10 np Purp  3x10 Dbl  Purp  3x10   SLS w/alpha NP NP NP np  np np np hep On AE  3x   Ab bracing with hip flexion NP NP NP np  np np np np np         Assessment: Tolerated treatment well  Patient demonstrated fatigue post treatment, exhibited good technique with therapeutic exercises and would benefit from continued PT  No discomfort throughout tx today and responded well to progressions made this visit  Plan: Continue per plan of care

## 2019-03-11 ENCOUNTER — OFFICE VISIT (OUTPATIENT)
Dept: PHYSICAL THERAPY | Facility: MEDICAL CENTER | Age: 24
End: 2019-03-11
Payer: COMMERCIAL

## 2019-03-11 DIAGNOSIS — M25.551 RIGHT HIP PAIN: Primary | ICD-10-CM

## 2019-03-11 PROCEDURE — 97110 THERAPEUTIC EXERCISES: CPT

## 2019-03-11 PROCEDURE — 97140 MANUAL THERAPY 1/> REGIONS: CPT

## 2019-03-11 PROCEDURE — 97112 NEUROMUSCULAR REEDUCATION: CPT

## 2019-03-11 NOTE — PROGRESS NOTES
Daily Note     Today's date: 3/11/2019  Patient name: Jonh Wilkerson  : 1995  MRN: [id number]  Referring provider: Shana Gray  Dx:   Encounter Diagnosis     ICD-10-CM    1  Right hip pain M25 551                   Subjective: Pt reports that she felt relief which lasted for approx 2-3 days after hip mobilization performed last week  Objective: See treatment diary below  Daily Treatment Diary     Manual  3/11            Lateral hip mobs grade III np            HPL to R hip flexor  Sprain/strain  protocol KO            PROM R hip HK            Long axis distraction R LE HK- hip  mob            Manual hip flexor str np                Exercise Diary  3/11            Bike 10'            Clams L2  3x10            Bridging 3x30            Glute set 5"  x30            Prone quad str 30"x3            Mm energy  10"x3            Ab bracing np            Reverse clam shells L2  3x10            Prone hip ext 3x10  2#            Glute raises 3x10  2#            Piriformis str 30"x3            Add sq With BR  3x10            Standing hip abd 2#  3x10            Stool IR/ER x30            Bridging with t-band abd Dbl purp  3x10              SLS w/alpha On AE  3x            Ab bracing with hip flexion NP                    Assessment: Tolerated treatment well  Patient demonstrated fatigue post treatment, exhibited good technique with therapeutic exercises and would benefit from continued PT  Pt responding well to hip mobs and HPL, along with strengthening ex's  Plan: Continue per plan of care

## 2019-03-14 ENCOUNTER — OFFICE VISIT (OUTPATIENT)
Dept: PHYSICAL THERAPY | Facility: MEDICAL CENTER | Age: 24
End: 2019-03-14
Payer: COMMERCIAL

## 2019-03-14 DIAGNOSIS — M25.551 RIGHT HIP PAIN: Primary | ICD-10-CM

## 2019-03-14 PROCEDURE — 97112 NEUROMUSCULAR REEDUCATION: CPT

## 2019-03-14 PROCEDURE — 97140 MANUAL THERAPY 1/> REGIONS: CPT

## 2019-03-14 PROCEDURE — 97110 THERAPEUTIC EXERCISES: CPT

## 2019-03-14 NOTE — PROGRESS NOTES
Daily Note     Today's date: 3/14/2019  Patient name: Mary Arvizu  : 1995  MRN: [id number]  Referring provider: Ky Tavarez*  Dx:   Encounter Diagnosis     ICD-10-CM    1  Right hip pain M25 551                   Subjective: Pt reports that her hip continues to improve and feels pretty good today  Objective: See treatment diary below    Daily Treatment Diary     Manual  3/11 3/14           Lateral hip mobs grade III np            HPL to R hip flexor  Sprain/strain  protocol KO KO           PROM R hip HK            Long axis distraction R LE HK- hip  mob            Manual hip flexor str np                Exercise Diary  3/11 3/14           Bike 10' 10'           Clams L2  3x10 L2  3x10           Bridging 3x10 3x10           Glute set 5"  x30 5"x30           Prone quad str 30"x3 30"x3           Mm energy  10"x3 10"x3           Ab bracing np            Reverse clam shells L2  3x10 L2  3x10           Prone hip ext 3x10  2# 3x10  2#           Glute raises 3x10  2# 3x10             Piriformis str 30"x3 30"x3           Add sq With BR  3x10 With  Br  3x10           Standing hip abd 2#  3x10 2#  3x10           Stool IR/ER x30 x30           Bridging with t-band abd Dbl purp  3x10   Dbl purp  3x10           SLS w/alpha On AE  3x AE  3x           Ab bracing with hip flexion NP np                   Assessment: Tolerated treatment well  Patient demonstrated fatigue post treatment, exhibited good technique with therapeutic exercises and would benefit from continued PT  Pt continues to respond well to current tx plan  Plan: Continue per plan of care

## 2019-03-18 ENCOUNTER — OFFICE VISIT (OUTPATIENT)
Dept: PHYSICAL THERAPY | Facility: MEDICAL CENTER | Age: 24
End: 2019-03-18
Payer: COMMERCIAL

## 2019-03-18 DIAGNOSIS — M25.551 RIGHT HIP PAIN: Primary | ICD-10-CM

## 2019-03-18 PROCEDURE — 97112 NEUROMUSCULAR REEDUCATION: CPT

## 2019-03-18 PROCEDURE — 97140 MANUAL THERAPY 1/> REGIONS: CPT

## 2019-03-18 PROCEDURE — 97110 THERAPEUTIC EXERCISES: CPT

## 2019-03-18 NOTE — PROGRESS NOTES
Daily Note     Today's date: 3/18/2019  Patient name: Nabor Brar  : 1995  MRN: [id number]  Referring provider: Jerel Drake*  Dx:   Encounter Diagnosis     ICD-10-CM    1  Right hip pain M25 551                   Subjective: Pt reports that her hip is feeling better, although still has the feeling as though she needs to "crack" her R hip  Objective: See treatment diary below  Daily Treatment Diary     Manual  3/11 3/14 3/18          Lateral hip mobs grade III np            HPL to R hip flexor  Sprain/strain  protocol KO KO KO          PROM R hip HK  KO          Long axis distraction R LE HK- hip  mob            Manual hip flexor str np                Exercise Diary  3/11 3/14 3/10          Bike 10' 10' 10'          Clams L2  3x10 L2  3x10 L2            Bridging 3x10 3x10 np          Glute set 5"  x30 5"x30 np          Prone quad str 30"x3 30"x3 30"x3          Mm energy  10"x3 10"x3 10"x3          Ab bracing np            Reverse clam shells L2  3x10 L2  3x10 L2  3x10          Prone hip ext 3x10  2# 3x10  2# 3x10  x2          Glute raises 3x10  2# 3x10   3x10          Piriformis str 30"x3 30"x3 30"x3          Add sq With BR  3x10 With  Br  3x10 At  home          Standing hip abd 2#  3x10 2#  3x10 2#  3x10          Stool IR/ER x30 x30 30x          Bridging with t-band abd Dbl purp  3x10   Dbl purp  3x10 Dbl  purp  3x10          SLS w/alpha On AE  3x AE  3x AE  3x          Ab bracing with hip flexion NP np np              Assessment: Tolerated treatment well  Patient demonstrated fatigue post treatment, exhibited good technique with therapeutic exercises and would benefit from continued PT  No mobs performed today, just PROM  Plan: Continue per plan of care

## 2019-03-21 ENCOUNTER — OFFICE VISIT (OUTPATIENT)
Dept: PHYSICAL THERAPY | Facility: MEDICAL CENTER | Age: 24
End: 2019-03-21
Payer: COMMERCIAL

## 2019-03-21 DIAGNOSIS — M25.551 RIGHT HIP PAIN: Primary | ICD-10-CM

## 2019-03-21 PROCEDURE — 97112 NEUROMUSCULAR REEDUCATION: CPT

## 2019-03-21 PROCEDURE — 97110 THERAPEUTIC EXERCISES: CPT

## 2019-03-21 PROCEDURE — 97140 MANUAL THERAPY 1/> REGIONS: CPT

## 2019-03-21 NOTE — PROGRESS NOTES
Daily Note     Today's date: 3/21/2019  Patient name: Homer Ceron  : 1995  MRN: [id number]  Referring provider: Kota Lopez  Dx:   Encounter Diagnosis     ICD-10-CM    1  Right hip pain M25 551                   Subjective: Pt reports that her hip has been feeling pretty good  Pt notes increased soreness today but believes it is due to the rainy weather  Objective: See treatment diary below  Daily Treatment Diary     Manual  3/11 3/14 3/18 3/21         Lateral hip mobs grade III np            HPL to R hip flexor  Sprain/strain  protocol KO KO KO np         PROM R hip HK  KO KO         Long axis distraction R LE HK- hip  mob   NR         Manual hip flexor str np                Exercise Diary  3/11 3/14 3/18 3/21         Bike 10' 10' 10' 10'         Clams L2  3x10 L2  3x10 L2   L2  3x10         Bridging 3x10 3x10 np D/C         Glute set 5"  x30 5"x30 np D/C         Prone quad str 30"x3 30"x3 30"x3 30"x3         Mm energy  10"x3 10"x3 10"x3 10"x3         Ab bracing np            Reverse clam shells L2  3x10 L2  3x10 L2  3x10 L2  3x10         Prone hip ext 3x10  2# 3x10  2# 3x10  x2 3x10  2#         Glute raises 3x10  2# 3x10   3x10 3x10  2#         Piriformis str 30"x3 30"x3 30"x3 30"x3         Add sq With BR  3x10 With  Br  3x10 At  home With  Br  3x10         Standing hip abd 2#  3x10 2#  3x10 2#  3x10 2#  3x10         Stool IR/ER x30 x30 30x          Bridging with t-band abd Dbl purp  3x10   Dbl purp  3x10 Dbl  purp  3x10 Dbl  purp  3x10         SLS w/alpha On AE  3x AE  3x AE  3x Black  disc  3x         Ab bracing with hip flexion NP np np D/C               Assessment: Tolerated treatment well  Patient demonstrated fatigue post treatment, exhibited good technique with therapeutic exercises and would benefit from continued PT  PT, HK assessed pt for leg length discrepancy, but was negative and no mobilizations were required  Plan: Continue per plan of care

## 2019-03-25 ENCOUNTER — OFFICE VISIT (OUTPATIENT)
Dept: PHYSICAL THERAPY | Facility: MEDICAL CENTER | Age: 24
End: 2019-03-25
Payer: COMMERCIAL

## 2019-03-25 DIAGNOSIS — M25.551 RIGHT HIP PAIN: Primary | ICD-10-CM

## 2019-03-25 PROCEDURE — 97110 THERAPEUTIC EXERCISES: CPT

## 2019-03-25 PROCEDURE — 97112 NEUROMUSCULAR REEDUCATION: CPT

## 2019-03-25 PROCEDURE — 97140 MANUAL THERAPY 1/> REGIONS: CPT

## 2019-03-25 NOTE — PROGRESS NOTES
Daily Note     Today's date: 3/25/2019  Patient name: Liborio Gupta  : 1995  MRN: [id number]  Referring provider: Stan Emery  Dx:   Encounter Diagnosis     ICD-10-CM    1  Right hip pain M25 551                   Subjective: Pt reports that her R hip is feeling pretty good      Objective: See treatment diary below  Daily Treatment Diary     Manual  3/11 3/14 3/18 3/21 3/25        Lateral hip mobs grade III np            HPL to R hip flexor  Sprain/strain  protocol KO KO KO np np        PROM R hip HK  KO KO KO        Long axis distraction R LE HK- hip  mob   NR KO        Manual hip flexor str np                Exercise Diary  3/11 3/14 3/18 3/21 3/25        Bike 10' 10' 10' 10' 10'        Clams L2  3x10 L2  3x10 L2   L2  3x10 np        Bridging 3x10 3x10 np D/C np        Glute set 5"  x30 5"x30 np D/C         Prone quad str 30"x3 30"x3 30"x3 30"x3 np        Mm energy  10"x3 10"x3 10"x3 10"x3 np        Ab bracing np            Reverse clam shells L2  3x10 L2  3x10 L2  3x10 L2  3x10 np        Prone hip ext 3x10  2# 3x10  2# 3x10  x2 3x10  2# 2#  3x10        Glute raises 3x10  2# 3x10   3x10 3x10  2# 2#  3x10        Piriformis str 30"x3 30"x3 30"x3 30"x3 30"x3        Add sq With BR  3x10 With  Br  3x10 At  home With  Br  3x10 np        Standing hip abd 2#  3x10 2#  3x10 2#  3x10 2#  3x10 2#  3x10        Stool IR/ER x30 x30 30x          Bridging with t-band abd Dbl purp  3x10   Dbl purp  3x10 Dbl  purp  3x10 Dbl  purp  3x10 np        SLS w/alpha On AE  3x AE  3x AE  3x Black  disc  3x Blue  Disc  3x        Ab bracing with hip flexion NP np np D/C D/C                Assessment: Tolerated treatment well  Patient demonstrated fatigue post treatment, exhibited good technique with therapeutic exercises and would benefit from continued PT  Pt unable to complete all ex's today 2* time constraints  Pt's mobility has greatly improved  Plan: Continue per plan of care

## 2019-03-28 ENCOUNTER — OFFICE VISIT (OUTPATIENT)
Dept: PHYSICAL THERAPY | Facility: MEDICAL CENTER | Age: 24
End: 2019-03-28
Payer: COMMERCIAL

## 2019-03-28 DIAGNOSIS — M25.551 RIGHT HIP PAIN: Primary | ICD-10-CM

## 2019-03-28 PROCEDURE — 97112 NEUROMUSCULAR REEDUCATION: CPT

## 2019-03-28 PROCEDURE — 97110 THERAPEUTIC EXERCISES: CPT

## 2019-03-28 PROCEDURE — 97140 MANUAL THERAPY 1/> REGIONS: CPT

## 2019-03-28 NOTE — LETTER
2019    Padmaja Razo MD  [address] [facility] [address]    Patient: Ria Garcia   YOB: 1995   Date of Visit: 3/28/2019     Encounter Diagnosis     ICD-10-CM    1  Right hip pain M25 551        Dear Dr Bell Mcneil:    Please review the attached Plan of Care from [facility] recent visit  Please verify that you agree therapy should continue by signing the attached document and sending it back to our office  If you have any questions or concerns, please don't hesitate to call  Sincerely,    Angela Moy PT      Referring Provider:      I certify that I have read the below Plan of Care and certify the need for these services furnished under this plan of treatment while under my care  Padmaja Razo MD  [address]: [phone]          Daily Note     Today's date: 3/28/2019  Patient name: Ria Garcia  : 1995  MRN: [id number]  Referring provider: Carlos Osorio*  Dx:   Encounter Diagnosis     ICD-10-CM    1  Right hip pain M25 551                   Subjective: Pt continues to note improvement in her hip         Objective: See treatment diary below  Daily Treatment Diary     Manual  3/11 3/14 3/18 3/21 3/25 3/28       Lateral hip mobs grade III np            HPL to R hip flexor  Sprain/strain  protocol KO KO KO np np KO       PROM R hip HK  KO KO KO KO       Long axis distraction R LE HK- hip  mob   NR KO KO       Manual hip flexor str np                Exercise Diary  3/11 3/14 3/18 3/21 3/25 3/28       Bike 10' 10' 10' 10' 10' 10'       Clams L2  3x10 L2  3x10 L2   L2  3x10 np np       Bridging 3x10 3x10 np D/C np np       Glute set 5"  x30 5"x30 np D/C         Prone quad str 30"x3 30"x3 30"x3 30"x3 np np       Mm energy  10"x3 10"x3 10"x3 10"x3 np np       Ab bracing np            Reverse clam shells L2  3x10 L2  3x10 L2  3x10 L2  3x10 np np       Prone hip ext 3x10  2# 3x10  2# 3x10  x2 3x10  2# 2#  3x10 2#  3x10       Glute raises 3x10  2# 3x10   3x10 3x10  2# 2#  3x10 2#  3x10       Piriformis str 30"x3 30"x3 30"x3 30"x3 30"x3 30"x3       Add sq With BR  3x10 With  Br  3x10 At  home With  Br  3x10 np np       Standing hip abd 2#  3x10 2#  3x10 2#  3x10 2#  3x10 2#  3x10 2#  3x10       Stool IR/ER x30 x30 30x   x30       Bridging with t-band abd Dbl purp  3x10   Dbl purp  3x10 Dbl  purp  3x10 Dbl  purp  3x10 np np       SLS w/alpha On AE  3x AE  3x AE  3x Black  disc  3x Blue  Disc  3x np       Ab bracing with hip flexion NP np np D/C D/C D/C           Assessment: Tolerated treatment well  Patient demonstrated fatigue post treatment, exhibited good technique with therapeutic exercises and would benefit from continued PT  Pt unable to complete all ex's due to time constraints - will resume at home  Plan: Continue per plan of care  PT Re-Evaluation     Today's date: 3/28/2019  Patient name: Liborio Gupta  : 1995  MRN: [id number]  Referring provider: Stan Emery*  Dx: R hip pain  S/p R hip arthroscopy-labral tear         Assessment  Assessment details: Liborio Gupta is a 26 y o  y/o female who presents s/p R hip labral repair  Patient is making steady progress toward achieving her established goals  Patient presents with right hip extensibility deficits and hip accessory weakness throughout  Pt  will benefit from skilled PT services that includes manual therapy techniques to enhance tissue extensibility, neuromuscular re-education to facilitate motor control, therapeutic exercise to increase functional mobility, and modalities prn to reduce pain and inflammation         Impairments: abnormal coordination, abnormal gait, abnormal muscle firing, abnormal muscle tone, abnormal or restricted ROM, abnormal movement, activity intolerance, impaired balance, impaired physical strength, lacks appropriate home exercise program, pain with function, weight-bearing intolerance and poor body mechanics  Barriers to therapy: Post operative fall and possible re-injury, as well as non-compliance with weight bearing and hip precautions  Understanding of Dx/Px/POC: good   Prognosis: good    Goals  Impairment Goals  - Pt I with initial HEP in 1-2 visits  Met  - Improve ROM equal to contralateral side in 4-6 weeks  Met  - Increase strength to 5/5 in all affected areas in 4-6 weeks  Not met    Functional Goals  - Increase Functional Status Measure to: 60 in 6-8 weeks  Not met   - Patient will be independent with comprehensive HEP in 6-8 weeks  Not met  - Ambulation is improved to prior level of function in 6-8 weeks   Met  - Stair climbing is improved to prior level of function in 6-8 weeks  Met  - Squatting is improved to prior level of function in 6-8 weeks Not met    Plan  Patient would benefit from: Continued physical therapy  Planned modality interventions: cryotherapy and thermotherapy: hydrocollator packs  Planned therapy interventions: joint mobilization, manual therapy, neuromuscular re-education, patient education, postural training, strengthening, stretching, therapeutic exercise, graded exercise, home exercise program, activity modification, ADL retraining, balance/weight bearing training, behavior modification, flexibility, functional ROM exercises and body mechanics training  Frequency: 2x week  Duration in weeks: 8  Treatment plan discussed with: patient      Subjective Evaluation    History of Present Illness  Date of surgery: 11/19/18  Mechanism of injury: Patient reports that she had a R hip labral repair by Dr Chica Ace on 11/19/18 and d/c'd crutches 2 weeks post operatively  She is no longer ambulating with an AD  Immediately after sx, patient reports falling on her right hip on 11/19/18  Patient states that she has followed up with Dr Chica Ace and will be following up again on Decmember 21st, 2018 because she has been in significant pain since the fall  Pain  Current pain ratin  At best pain ratin  At worst pain ratin  Quality: tight and pulling  Alleviating factors: Tylenol  Treatments  Current treatment: physical therapy  Patient Goals  Patient goals for therapy: decreased pain, increased motion, improved balance, increased strength, independence with ADLs/IADLs and return to sport/leisure activities  Patient goal: Weight lifting     Subjective update:    Pt is no longer having the sharp pain in her R hip with hip flexion  She is also no longer having the feeling that her hip needs to "pop"  Strength and endurance are slowly returning to normal     Objective       Tenderness     Right Hip     - TTP R hip flexor    Lumbar Screen  Lumbar range of motion within normal limits      Neurological Testing     Sensation     Hip   Left Hip   Intact: light touch    Right Hip   Intact: light touch    Active Range of Motion   Left Hip   Flexion: 120 degrees   Extension: 12 degrees   Abduction: 35 degrees   Adduction: 30 degrees   External rotation (prone): 45 degrees   Internal rotation (prone): 55 degrees     Right Hip   Flexion: 120 degrees   Extension: 12 degrees   Abduction: 35 degrees   Adduction: 30   External rotation (prone): 45 degrees   Internal rotation (prone): 55    Strength/Myotome Testing     Left Hip   Normal muscle strength    Right Hip   Planes of Motion   Flexion: 4+  Extension: 4+  Abduction: 4  Right hip adduction strength: 4+    Isolated Muscles   Gluteus maximums: 4+  Gluteus medius: 4  TFL: 4+  Iliopsoas: 4+

## 2019-03-28 NOTE — PROGRESS NOTES
PT Re-Evaluation     Today's date: 3/28/2019  Patient name: Ehsan Hallman  : 1995  MRN: [id number]  Referring provider: Pura Contreras*  Dx: R hip pain  S/p R hip arthroscopy-labral tear         Assessment  Assessment details: Ehsan Hallman is a 26 y o  y/o female who presents s/p R hip labral repair  Patient is making steady progress toward achieving her established goals  Patient presents with right hip extensibility deficits and hip accessory weakness throughout  Pt  will benefit from skilled PT services that includes manual therapy techniques to enhance tissue extensibility, neuromuscular re-education to facilitate motor control, therapeutic exercise to increase functional mobility, and modalities prn to reduce pain and inflammation  Impairments: abnormal coordination, abnormal gait, abnormal muscle firing, abnormal muscle tone, abnormal or restricted ROM, abnormal movement, activity intolerance, impaired balance, impaired physical strength, lacks appropriate home exercise program, pain with function, weight-bearing intolerance and poor body mechanics  Barriers to therapy: Post operative fall and possible re-injury, as well as non-compliance with weight bearing and hip precautions    Understanding of Dx/Px/POC: good   Prognosis: good    Goals  Impairment Goals  - Pt I with initial HEP in 1-2 visits  Met  - Improve ROM equal to contralateral side in 4-6 weeks  Met  - Increase strength to 5/5 in all affected areas in 4-6 weeks  Not met    Functional Goals  - Increase Functional Status Measure to: 60 in 6-8 weeks  Not met   - Patient will be independent with comprehensive HEP in 6-8 weeks  Not met  - Ambulation is improved to prior level of function in 6-8 weeks   Met  - Stair climbing is improved to prior level of function in 6-8 weeks  Met  - Squatting is improved to prior level of function in 6-8 weeks Not met    Plan  Patient would benefit from: Continued physical therapy  Planned modality interventions: cryotherapy and thermotherapy: hydrocollator packs  Planned therapy interventions: joint mobilization, manual therapy, neuromuscular re-education, patient education, postural training, strengthening, stretching, therapeutic exercise, graded exercise, home exercise program, activity modification, ADL retraining, balance/weight bearing training, behavior modification, flexibility, functional ROM exercises and body mechanics training  Frequency: 2x week  Duration in weeks: 8  Treatment plan discussed with: patient      Subjective Evaluation    History of Present Illness  Date of surgery: 18  Mechanism of injury: Patient reports that she had a R hip labral repair by Dr Yana Gasca on 18 and d/c'd crutches 2 weeks post operatively  She is no longer ambulating with an AD  Immediately after sx, patient reports falling on her right hip on 18  Patient states that she has followed up with Dr Yana Gasca and will be following up again on 2018 because she has been in significant pain since the fall  Pain  Current pain ratin  At best pain ratin  At worst pain ratin  Quality: tight and pulling  Alleviating factors: Tylenol  Treatments  Current treatment: physical therapy  Patient Goals  Patient goals for therapy: decreased pain, increased motion, improved balance, increased strength, independence with ADLs/IADLs and return to sport/leisure activities  Patient goal: Weight lifting     Subjective update:    Pt is no longer having the sharp pain in her R hip with hip flexion  She is also no longer having the feeling that her hip needs to "pop"  Strength and endurance are slowly returning to normal     Objective       Tenderness     Right Hip     - TTP R hip flexor    Lumbar Screen  Lumbar range of motion within normal limits      Neurological Testing     Sensation     Hip   Left Hip   Intact: light touch    Right Hip   Intact: light touch    Active Range of Motion   Left Hip   Flexion: 120 degrees   Extension: 12 degrees   Abduction: 35 degrees   Adduction: 30 degrees   External rotation (prone): 45 degrees   Internal rotation (prone): 55 degrees     Right Hip   Flexion: 120 degrees   Extension: 12 degrees   Abduction: 35 degrees   Adduction: 30   External rotation (prone): 45 degrees   Internal rotation (prone): 55    Strength/Myotome Testing     Left Hip   Normal muscle strength    Right Hip   Planes of Motion   Flexion: 4+  Extension: 4+  Abduction: 4  Right hip adduction strength: 4+    Isolated Muscles   Gluteus maximums: 4+  Gluteus medius: 4  TFL: 4+  Iliopsoas: 4+

## 2019-03-28 NOTE — PROGRESS NOTES
Daily Note     Today's date: 3/28/2019  Patient name: Bing Uriarte  : 1995  MRN: [id number]  Referring provider: Christina Bob  Dx:   Encounter Diagnosis     ICD-10-CM    1  Right hip pain M25 551                   Subjective: Pt continues to note improvement in her hip  Objective: See treatment diary below  Daily Treatment Diary     Manual  3/11 3/14 3/18 3/21 3/25 3/28       Lateral hip mobs grade III np            HPL to R hip flexor  Sprain/strain  protocol KO KO KO np np KO       PROM R hip HK  KO KO KO KO       Long axis distraction R LE HK- hip  mob   NR KO KO       Manual hip flexor str np                Exercise Diary  3/11 3/14 3/18 3/21 3/25 3/28       Bike 10' 10' 10' 10' 10' 10'       Clams L2  3x10 L2  3x10 L2   L2  3x10 np np       Bridging 3x10 3x10 np D/C np np       Glute set 5"  x30 5"x30 np D/C         Prone quad str 30"x3 30"x3 30"x3 30"x3 np np       Mm energy  10"x3 10"x3 10"x3 10"x3 np np       Ab bracing np            Reverse clam shells L2  3x10 L2  3x10 L2  3x10 L2  3x10 np np       Prone hip ext 3x10  2# 3x10  2# 3x10  x2 3x10  2# 2#  3x10 2#  3x10       Glute raises 3x10  2# 3x10   3x10 3x10  2# 2#  3x10 2#  3x10       Piriformis str 30"x3 30"x3 30"x3 30"x3 30"x3 30"x3       Add sq With BR  3x10 With  Br  3x10 At  home With  Br  3x10 np np       Standing hip abd 2#  3x10 2#  3x10 2#  3x10 2#  3x10 2#  3x10 2#  3x10       Stool IR/ER x30 x30 30x   x30       Bridging with t-band abd Dbl purp  3x10   Dbl purp  3x10 Dbl  purp  3x10 Dbl  purp  3x10 np np       SLS w/alpha On AE  3x AE  3x AE  3x Black  disc  3x Blue  Disc  3x np       Ab bracing with hip flexion NP np np D/C D/C D/C           Assessment: Tolerated treatment well  Patient demonstrated fatigue post treatment, exhibited good technique with therapeutic exercises and would benefit from continued PT  Pt unable to complete all ex's due to time constraints - will resume at home         Plan: Continue per plan of care

## 2019-03-29 ENCOUNTER — TRANSCRIBE ORDERS (OUTPATIENT)
Dept: PHYSICAL THERAPY | Facility: MEDICAL CENTER | Age: 24
End: 2019-03-29

## 2019-03-29 DIAGNOSIS — M25.551 RIGHT HIP PAIN: Primary | ICD-10-CM

## 2019-04-01 ENCOUNTER — OFFICE VISIT (OUTPATIENT)
Dept: PHYSICAL THERAPY | Facility: MEDICAL CENTER | Age: 24
End: 2019-04-01
Payer: COMMERCIAL

## 2019-04-01 DIAGNOSIS — M25.551 RIGHT HIP PAIN: Primary | ICD-10-CM

## 2019-04-01 PROCEDURE — 97110 THERAPEUTIC EXERCISES: CPT

## 2019-04-01 PROCEDURE — 97112 NEUROMUSCULAR REEDUCATION: CPT

## 2019-04-01 PROCEDURE — 97140 MANUAL THERAPY 1/> REGIONS: CPT

## 2019-04-04 ENCOUNTER — APPOINTMENT (OUTPATIENT)
Dept: PHYSICAL THERAPY | Facility: MEDICAL CENTER | Age: 24
End: 2019-04-04
Payer: COMMERCIAL

## 2019-04-08 ENCOUNTER — OFFICE VISIT (OUTPATIENT)
Dept: PHYSICAL THERAPY | Facility: MEDICAL CENTER | Age: 24
End: 2019-04-08
Payer: COMMERCIAL

## 2019-04-08 DIAGNOSIS — M25.551 RIGHT HIP PAIN: Primary | ICD-10-CM

## 2019-04-08 PROCEDURE — 97140 MANUAL THERAPY 1/> REGIONS: CPT

## 2019-04-08 PROCEDURE — 97110 THERAPEUTIC EXERCISES: CPT

## 2019-04-08 PROCEDURE — 97112 NEUROMUSCULAR REEDUCATION: CPT

## 2019-04-11 ENCOUNTER — APPOINTMENT (OUTPATIENT)
Dept: PHYSICAL THERAPY | Facility: MEDICAL CENTER | Age: 24
End: 2019-04-11
Payer: COMMERCIAL

## 2019-04-15 ENCOUNTER — OFFICE VISIT (OUTPATIENT)
Dept: PHYSICAL THERAPY | Facility: MEDICAL CENTER | Age: 24
End: 2019-04-15
Payer: COMMERCIAL

## 2019-04-15 DIAGNOSIS — M25.551 RIGHT HIP PAIN: Primary | ICD-10-CM

## 2019-04-15 PROCEDURE — 97140 MANUAL THERAPY 1/> REGIONS: CPT

## 2019-04-15 PROCEDURE — 97110 THERAPEUTIC EXERCISES: CPT

## 2019-04-22 ENCOUNTER — OFFICE VISIT (OUTPATIENT)
Dept: PHYSICAL THERAPY | Facility: MEDICAL CENTER | Age: 24
End: 2019-04-22
Payer: COMMERCIAL

## 2019-04-22 DIAGNOSIS — M25.551 RIGHT HIP PAIN: Primary | ICD-10-CM

## 2019-04-22 PROCEDURE — 97110 THERAPEUTIC EXERCISES: CPT

## 2019-04-22 PROCEDURE — 97140 MANUAL THERAPY 1/> REGIONS: CPT

## 2019-04-29 ENCOUNTER — OFFICE VISIT (OUTPATIENT)
Dept: PHYSICAL THERAPY | Facility: MEDICAL CENTER | Age: 24
End: 2019-04-29
Payer: COMMERCIAL

## 2019-04-29 DIAGNOSIS — M25.551 RIGHT HIP PAIN: Primary | ICD-10-CM

## 2019-04-29 PROCEDURE — 97140 MANUAL THERAPY 1/> REGIONS: CPT

## 2019-04-29 PROCEDURE — 97110 THERAPEUTIC EXERCISES: CPT

## 2019-05-06 ENCOUNTER — OFFICE VISIT (OUTPATIENT)
Dept: PHYSICAL THERAPY | Facility: MEDICAL CENTER | Age: 24
End: 2019-05-06
Payer: COMMERCIAL

## 2019-05-06 DIAGNOSIS — M25.551 RIGHT HIP PAIN: Primary | ICD-10-CM

## 2019-05-06 PROCEDURE — 97110 THERAPEUTIC EXERCISES: CPT

## 2019-05-06 PROCEDURE — 97140 MANUAL THERAPY 1/> REGIONS: CPT

## 2019-05-13 ENCOUNTER — OFFICE VISIT (OUTPATIENT)
Dept: PHYSICAL THERAPY | Facility: MEDICAL CENTER | Age: 24
End: 2019-05-13
Payer: COMMERCIAL

## 2019-05-13 DIAGNOSIS — M25.551 RIGHT HIP PAIN: Primary | ICD-10-CM

## 2019-05-13 PROCEDURE — 97140 MANUAL THERAPY 1/> REGIONS: CPT

## 2019-05-13 PROCEDURE — G8992 OTHER PT/OT  D/C STATUS: HCPCS

## 2019-05-13 PROCEDURE — 97110 THERAPEUTIC EXERCISES: CPT

## 2019-05-13 PROCEDURE — G8991 OTHER PT/OT GOAL STATUS: HCPCS

## 2019-05-29 DIAGNOSIS — Z30.41 ENCOUNTER FOR SURVEILLANCE OF CONTRACEPTIVE PILLS: ICD-10-CM

## 2019-05-29 RX ORDER — LEVONORGESTREL AND ETHINYL ESTRADIOL 0.1-0.02MG
KIT ORAL
Qty: 84 TABLET | Refills: 4 | Status: SHIPPED | OUTPATIENT
Start: 2019-05-29 | End: 2020-03-25

## 2019-08-26 ENCOUNTER — ANNUAL EXAM (OUTPATIENT)
Dept: OBGYN CLINIC | Facility: CLINIC | Age: 24
End: 2019-08-26
Payer: COMMERCIAL

## 2019-08-26 VITALS — SYSTOLIC BLOOD PRESSURE: 112 MMHG | DIASTOLIC BLOOD PRESSURE: 68 MMHG | BODY MASS INDEX: 24.24 KG/M2 | WEIGHT: 139 LBS

## 2019-08-26 DIAGNOSIS — Z01.419 WOMEN'S ANNUAL ROUTINE GYNECOLOGICAL EXAMINATION: Primary | ICD-10-CM

## 2019-08-26 DIAGNOSIS — R63.5 WEIGHT GAIN: ICD-10-CM

## 2019-08-26 PROCEDURE — 99395 PREV VISIT EST AGE 18-39: CPT | Performed by: OBSTETRICS & GYNECOLOGY

## 2019-08-26 NOTE — PROGRESS NOTES
A/P    1  Annual exam    Last PAP 2018    Next due 2021   Scheduling of pap discussed in detail     2  Contraception -    Cont the pill no complaints about the pills    3  Unexplained weight gain    Since 7/2019- she has gained 12 pounds almost 25 since 2016   She did have an injury in nov and did less exercise but since may has been exercising regularly and eating very well and still gaining weight very concerning for her    On the ocp x 5+ years no weight gain till now  tsh was checked in 2/18 will check now   Unsure of cause unlikely ocp since on someone and same dose x many years  If normal will see pcp    Previous note states pcos with irregular cycles prior to ocp could be cause of weigh gain unlikely but a possibility         24 y o ,No LMP recorded  (Menstrual status: Birth Control)  C/O weight gain unexplained and not responding to normal weight loss efforts       Past medical / social / surgical / family history reviewed and updated   Medication and allergies discussed in detail and updated       Review of Systems - History obtained from chart review and the patient  General ROS: + weight gain   Psychological ROS: negative  Ophthalmic ROS: negative  ENT ROS: negative  Allergy and Immunology ROS: negative  Hematological and Lymphatic ROS: negative  Endocrine ROS: negative  Breast ROS: negative for breast lumps  Respiratory ROS: no cough, shortness of breath, or wheezing  Cardiovascular ROS: no chest pain or dyspnea on exertion  Gastrointestinal ROS: no abdominal pain, change in bowel habits, or black or bloody stools  Genito-Urinary ROS: no dysuria, trouble voiding, or hematuria  Musculoskeletal ROS: negative  Neurological ROS: no TIA or stroke symptoms  Dermatological ROS: negative    Physical Exam   Constitutional: She is oriented to person, place, and time  She appears well-developed  Eyes: EOM are normal    Neck: Normal range of motion  No thyromegaly present     Cardiovascular: Normal rate and normal heart sounds  Pulmonary/Chest: Effort normal  No accessory muscle usage  No respiratory distress  She exhibits no tenderness  Right breast exhibits no inverted nipple, no mass and no tenderness  Left breast exhibits no inverted nipple, no mass and no tenderness  No breast tenderness or discharge  Breasts are symmetrical    Abdominal: Soft  She exhibits no distension  There is no tenderness  There is no rebound, no guarding and no CVA tenderness  Genitourinary: Vagina normal and uterus normal  Rectal exam shows no external hemorrhoid  No breast tenderness or discharge  Pelvic exam was performed with patient supine  No labial fusion  There is no rash, tenderness, lesion or injury on the right labia  There is no rash, tenderness, lesion or injury on the left labia  Uterus is not enlarged and not fixed  Cervix exhibits no motion tenderness and no discharge  Right adnexum displays no mass, no tenderness and no fullness  Left adnexum displays no mass, no tenderness and no fullness  No bleeding in the vagina  No foreign body in the vagina  No vaginal discharge found  Lymphadenopathy:     She has no cervical adenopathy  Right: No inguinal and no supraclavicular adenopathy present  Left: No inguinal and no supraclavicular adenopathy present  Neurological: She is alert and oriented to person, place, and time  Skin: Skin is intact  Psychiatric: She has a normal mood and affect  Her speech is normal and behavior is normal  Judgment and thought content normal    Vitals reviewed

## 2019-08-29 ENCOUNTER — TELEPHONE (OUTPATIENT)
Dept: OBGYN CLINIC | Facility: CLINIC | Age: 24
End: 2019-08-29

## 2019-09-03 ENCOUNTER — TELEPHONE (OUTPATIENT)
Dept: OBGYN CLINIC | Facility: CLINIC | Age: 24
End: 2019-09-03

## 2019-09-03 NOTE — TELEPHONE ENCOUNTER
Called about her results   tsh and cbc are normal     Advised that she should call pcp and maybe stop the ocp if the weight is increasing

## 2019-09-18 ENCOUNTER — OFFICE VISIT (OUTPATIENT)
Dept: URGENT CARE | Age: 24
End: 2019-09-18
Payer: COMMERCIAL

## 2019-09-18 VITALS
SYSTOLIC BLOOD PRESSURE: 115 MMHG | OXYGEN SATURATION: 100 % | HEIGHT: 64 IN | BODY MASS INDEX: 23.22 KG/M2 | TEMPERATURE: 98.9 F | HEART RATE: 92 BPM | RESPIRATION RATE: 18 BRPM | WEIGHT: 136 LBS | DIASTOLIC BLOOD PRESSURE: 85 MMHG

## 2019-09-18 DIAGNOSIS — J01.10 ACUTE FRONTAL SINUSITIS, RECURRENCE NOT SPECIFIED: Primary | ICD-10-CM

## 2019-09-18 PROCEDURE — 99213 OFFICE O/P EST LOW 20 MIN: CPT | Performed by: PHYSICIAN ASSISTANT

## 2019-09-18 RX ORDER — AMOXICILLIN AND CLAVULANATE POTASSIUM 875; 125 MG/1; MG/1
1 TABLET, FILM COATED ORAL EVERY 12 HOURS SCHEDULED
Qty: 14 TABLET | Refills: 0 | Status: SHIPPED | OUTPATIENT
Start: 2019-09-18 | End: 2019-09-25

## 2019-09-18 NOTE — PATIENT INSTRUCTIONS
Follow up with PCP in 3-5 days  Proceed to  ER if symptoms worsen  Patient will begin augmentin as directed  Nasal decongestants / nasal spray as directed  Increase fluids  Motrin / tylenol as needed for pain / fever     Sinusitis   WHAT YOU NEED TO KNOW:   Sinusitis is inflammation or infection of your sinuses  It is most often caused by a virus  Acute sinusitis may last up to 12 weeks  Chronic sinusitis lasts longer than 12 weeks  Recurrent sinusitis means you have 4 or more times in 1 year  DISCHARGE INSTRUCTIONS:   Return to the emergency department if:   · Your eye and eyelid are red, swollen, and painful  · You cannot open your eye  · You have vision changes, such as double vision  · Your eyeball bulges out or you cannot move your eye  · You are more sleepy than normal, or you notice changes in your ability to think, move, or talk  · You have a stiff neck, a fever, or a bad headache  · You have swelling of your forehead or scalp  Contact your healthcare provider if:   · Your symptoms do not improve after 3 days  · Your symptoms do not go away after 10 days  · You have nausea and are vomiting  · Your nose is bleeding  · You have questions or concerns about your condition or care  Medicines: Your symptoms may go away on their own  Your healthcare provider may recommend watchful waiting for up to 10 days before starting antibiotics  You may  need any of the following:  · Acetaminophen  decreases pain and fever  It is available without a doctor's order  Ask how much to take and how often to take it  Follow directions  Read the labels of all other medicines you are using to see if they also contain acetaminophen, or ask your doctor or pharmacist  Acetaminophen can cause liver damage if not taken correctly  Do not use more than 4 grams (4,000 milligrams) total of acetaminophen in one day  · NSAIDs , such as ibuprofen, help decrease swelling, pain, and fever   This medicine is available with or without a doctor's order  NSAIDs can cause stomach bleeding or kidney problems in certain people  If you take blood thinner medicine, always ask your healthcare provider if NSAIDs are safe for you  Always read the medicine label and follow directions  · Nasal steroid sprays  may help decrease inflammation in your nose and sinuses  · Decongestants  help reduce swelling and drain mucus in the nose and sinuses  They may help you breathe easier  · Antihistamines  help dry mucus in the nose and relieve sneezing  · Antibiotics  help treat or prevent a bacterial infection  · Take your medicine as directed  Contact your healthcare provider if you think your medicine is not helping or if you have side effects  Tell him or her if you are allergic to any medicine  Keep a list of the medicines, vitamins, and herbs you take  Include the amounts, and when and why you take them  Bring the list or the pill bottles to follow-up visits  Carry your medicine list with you in case of an emergency  Self-care:   · Rinse your sinuses  Use a sinus rinse device to rinse your nasal passages with a saline (salt water) solution or distilled water  Do not use tap water  This will help thin the mucus in your nose and rinse away pollen and dirt  It will also help reduce swelling so you can breathe normally  Ask your healthcare provider how often to do this  · Breathe in steam   Heat a bowl of water until you see steam  Lean over the bowl and make a tent over your head with a large towel  Breathe deeply for about 20 minutes  Be careful not to get too close to the steam or burn yourself  Do this 3 times a day  You can also breathe deeply when you take a hot shower  · Sleep with your head elevated  Place an extra pillow under your head before you go to sleep to help your sinuses drain  · Drink liquids as directed    Ask your healthcare provider how much liquid to drink each day and which liquids are best for you  Liquids will thin the mucus in your nose and help it drain  Avoid drinks that contain alcohol or caffeine  · Do not smoke, and avoid secondhand smoke  Nicotine and other chemicals in cigarettes and cigars can make your symptoms worse  Ask your healthcare provider for information if you currently smoke and need help to quit  E-cigarettes or smokeless tobacco still contain nicotine  Talk to your healthcare provider before you use these products  Prevent the spread of germs that cause sinusitis:  Wash your hands often with soap and water  Wash your hands after you use the bathroom, change a child's diaper, or sneeze  Wash your hands before you prepare or eat food  Follow up with your healthcare provider as directed: You may be referred to an ear, nose, and throat specialist  Write down your questions so you remember to ask them during your visits  © 2017 [facility] Information is for End User's use only and may not be sold, redistributed or otherwise used for commercial purposes  All illustrations and images included in CareNotes® are the copyrighted property of A [employer] M [employer]  or [employer]  The above information is an  only  It is not intended as medical advice for individual conditions or treatments  Talk to your doctor, nurse or pharmacist before following any medical regimen to see if it is safe and effective for you

## 2019-09-18 NOTE — PROGRESS NOTES
[facility]        NAME: Alix Hall is a 27 y o  female  : 1995    MRN: [id number]  DATE: 2019  TIME: 9:05 PM    Assessment and Plan   Acute frontal sinusitis, recurrence not specified [J01 10]  1  Acute frontal sinusitis, recurrence not specified  amoxicillin-clavulanate (AUGMENTIN) 875-125 mg per tablet         Patient Instructions     Follow up with PCP in 3-5 days  Proceed to  ER if symptoms worsen  Patient will begin augmentin as directed  Nasal decongestants / nasal spray as directed  Increase fluids  Motrin / tylenol as needed for pain / fever       Chief Complaint     Chief Complaint   Patient presents with    Cold Like Symptoms     Pt c/o headache, sinus pain/pressure, post-nasal drip since   Denies fever  Pt has been taking Advil for symptoms  History of Present Illness       Patient presents to the clinic for sinus pressure, congestion and migraine (similar to prior migraines)  Patient states that the symptoms have been ongoing for the past 4 days  Patient states that she started out with a sore throat but then progressed to sinus pressure and pain which then led to a migraine  Patient states that she has been advil with minimal relief  Patient denies any prior history of sinus pressure / pain  Patient offers no additional complaints  Sinusitis   This is a new problem  The current episode started in the past 7 days  The problem has been gradually worsening since onset  There has been no fever  Her pain is at a severity of 7/10  The pain is moderate  Associated symptoms include congestion, diaphoresis, headaches, neck pain, sinus pressure, sneezing and swollen glands  Pertinent negatives include no chills, coughing, ear pain, hoarse voice, shortness of breath or sore throat  Past treatments include acetaminophen  The treatment provided mild relief  Review of Systems   Review of Systems   Constitutional: Positive for diaphoresis and fatigue  Negative for chills and fever  HENT: Positive for congestion, rhinorrhea, sinus pressure and sneezing  Negative for ear pain, hoarse voice, postnasal drip and sore throat  Eyes: Negative  Respiratory: Positive for chest tightness  Negative for cough, shortness of breath and wheezing  Cardiovascular: Negative  Gastrointestinal: Negative  Endocrine: Negative  Genitourinary: Negative  Musculoskeletal: Positive for neck pain  Negative for back pain, myalgias and neck stiffness  Skin: Negative  Allergic/Immunologic: Negative  Neurological: Positive for dizziness, light-headedness and headaches  Negative for weakness  Hematological: Negative  Psychiatric/Behavioral: Negative            Current Medications       Current Outpatient Medications:     famotidine (PEPCID) 20 mg tablet, , Disp: , Rfl: 0    ibuprofen (MOTRIN) 400 mg tablet, Take by mouth once as needed  , Disp: , Rfl:     LESSINA 0 1-20 MG-MCG per tablet, take 1 tablet by mouth once daily, Disp: 84 tablet, Rfl: 4    pantoprazole (PROTONIX) 40 mg tablet, , Disp: , Rfl:     amoxicillin-clavulanate (AUGMENTIN) 875-125 mg per tablet, Take 1 tablet by mouth every 12 (twelve) hours for 7 days, Disp: 14 tablet, Rfl: 0    Current Allergies     Allergies as of 09/18/2019    (No Known Allergies)            The following portions of the patient's history were reviewed and updated as appropriate: allergies, current medications, past family history, past medical history, past social history, past surgical history and problem list      Past Medical History:   Diagnosis Date    GERD (gastroesophageal reflux disease)     Polycystic ovary syndrome        Past Surgical History:   Procedure Laterality Date    HIP SURGERY  2018    KNEE SURGERY      TOOTH EXTRACTION         Family History   Problem Relation Age of Onset    Diabetes Father     Diabetes Other         Paternal Half-Sister, Paternal Great-Grandmother    Diabetes Paternal Uncle     Cirrhosis Paternal Uncle     Diabetes Family          Medications have been verified  Objective   /85   Pulse 92   Temp 98 9 °F (37 2 °C)   Resp 18   Ht 5' 3 5" (1 613 m)   Wt 61 7 kg (136 lb)   SpO2 100%   BMI 23 71 kg/m²        Physical Exam     Physical Exam   Constitutional: She is oriented to person, place, and time  She appears well-developed and well-nourished  HENT:   Head: Normocephalic  Right Ear: Hearing, tympanic membrane, external ear and ear canal normal    Left Ear: Hearing, tympanic membrane and ear canal normal    Nose: Mucosal edema and rhinorrhea present  No nose lacerations, sinus tenderness, nasal deformity or septal deviation  Right sinus exhibits maxillary sinus tenderness and frontal sinus tenderness  Left sinus exhibits maxillary sinus tenderness and frontal sinus tenderness  Mouth/Throat: Mucous membranes are normal  Posterior oropharyngeal erythema present  No posterior oropharyngeal edema  Tonsils are 0 on the right  Tonsils are 0 on the left  Eyes: Pupils are equal, round, and reactive to light  Conjunctivae and EOM are normal    Neck: Normal range of motion  Cardiovascular: Normal rate, regular rhythm, normal heart sounds and intact distal pulses  Pulmonary/Chest: Effort normal and breath sounds normal    Neurological: She is alert and oriented to person, place, and time  Skin: Skin is warm  Capillary refill takes less than 2 seconds  Psychiatric: She has a normal mood and affect  Her behavior is normal  Judgment and thought content normal    Nursing note and vitals reviewed

## 2020-03-05 ENCOUNTER — APPOINTMENT (OUTPATIENT)
Dept: RADIOLOGY | Age: 25
End: 2020-03-05
Payer: COMMERCIAL

## 2020-03-05 ENCOUNTER — OFFICE VISIT (OUTPATIENT)
Dept: URGENT CARE | Age: 25
End: 2020-03-05
Payer: COMMERCIAL

## 2020-03-05 VITALS
OXYGEN SATURATION: 100 % | TEMPERATURE: 98 F | HEIGHT: 63 IN | HEART RATE: 88 BPM | SYSTOLIC BLOOD PRESSURE: 114 MMHG | DIASTOLIC BLOOD PRESSURE: 81 MMHG | WEIGHT: 131.4 LBS | BODY MASS INDEX: 23.28 KG/M2 | RESPIRATION RATE: 18 BRPM

## 2020-03-05 DIAGNOSIS — M76.62 ACHILLES TENDINITIS OF LEFT LOWER EXTREMITY: Primary | ICD-10-CM

## 2020-03-05 DIAGNOSIS — M76.60 ACHILLES TENDON PAIN: ICD-10-CM

## 2020-03-05 PROCEDURE — 73630 X-RAY EXAM OF FOOT: CPT

## 2020-03-05 PROCEDURE — 99213 OFFICE O/P EST LOW 20 MIN: CPT | Performed by: PHYSICIAN ASSISTANT

## 2020-03-05 NOTE — PATIENT INSTRUCTIONS
Rest, Ice, and Elevate limb  Continue to monitor symptoms  If symptoms do not improve in one week, follow up with orthopedics  Call [facility] [phone] to schedule and appointment  If new or worsening symptoms occur, go immediately to the ER  Achilles Tendinitis   WHAT YOU NEED TO KNOW:   Achilles tendinitis is swelling of the tendon that connects your calf muscle to your heel bone  It may happen suddenly or become a chronic condition  Your risk for Achilles tendinitis increases as you age  DISCHARGE INSTRUCTIONS:   Contact your healthcare provider if:   · You have a fever  · Your swelling or pain gets worse  · You feel or hear a sudden pop near your ankle  · You cannot bend your ankle or put pressure on your leg  · You have questions about your condition or care  Medicines:   · NSAIDs , such as ibuprofen, help decrease swelling, pain, and fever  This medicine is available with or without a doctor's order  NSAIDs can cause stomach bleeding or kidney problems in certain people  If you take blood thinner medicine, always ask your healthcare provider if NSAIDs are safe for you  Always read the medicine label and follow directions  · Take your medicine as directed  Contact your healthcare provider if you think your medicine is not helping or if you have side effects  Tell him or her if you are allergic to any medicine  Keep a list of the medicines, vitamins, and herbs you take  Include the amounts, and when and why you take them  Bring the list or the pill bottles to follow-up visits  Carry your medicine list with you in case of an emergency  Manage your Achilles tendinitis:   · Rest  as directed  Rest decreases swelling and prevents your tendinitis from getting worse  Your healthcare provider may tell you to stop your usual training or exercise activities  Ask him when you can return to your normal activities or exercise plan       · Apply ice  on your Achilles tendon for 15 to 20 minutes every hour or as directed  Use an ice pack, or put crushed ice in a plastic bag  Cover it with a towel  Ice helps prevent tissue damage and decreases swelling and pain  · Wear a compression bandage or use tape  as directed  This will decrease swelling and pain  Ask your healthcare provider how to wrap a compression bandage or apply tape  If you use a support device ask if you should wear a compression bandage or use tape  · Elevate  your heel above the level of your heart as often as you can  This will help decrease swelling and pain  Prop your heel on pillows or blankets to keep it elevated comfortably  · Stretch  as directed when you return to your exercise program  Always warm up your muscles and stretch before you exercise  Do cool down exercises and stretches when you are finished  This will keep your muscles loose and decrease stress on your Achilles tendon  · Do bilateral heel drop exercises as directed  Bilateral heel drops strengthen your Achilles tendon  Do not do the following exercise unless your healthcare provider says it is safe:     ¨ Stand at the edge of a stair or raised step  Hold onto the railing for balance  ¨ Place the front part of your foot on the stair or step  Let the back of your foot hang off of the stair or step  ¨ Slowly lift your heels off the ground and then slowly lower your heels past the stair  Do not move your heels quickly  This could make your injury worse  Repeat this exercise 20 times or as directed  · Slowly increase the time and intensity when you return to your exercise program   Start with short and low intensity exercises  Ask your healthcare provider how and when to increase the time and intensity of your exercise  Wear support devices or supportive shoes as directed  Support devices may include a splint, orthotic, or brace  These devices will decrease pressure on your Achilles tendon and help relieve pain  Supportive shoes will cushion your heel and protect your Achilles tendon  Replace shoes or sneakers that are worn out  Go to physical therapy and practice exercises as directed:  A physical therapist teaches you exercises to help improve movement and strength, and decrease pain  Practice these exercises at home as directed  Follow up with your healthcare provider as directed:  Write down your questions so you remember to ask them during your visits  © 2017 [address] [employer]  Information is for End User's use only and may not be sold, redistributed or otherwise used for commercial purposes  All illustrations and images included in CareNotes® are the copyrighted property of [employer]  or [employer]  The above information is an  only  It is not intended as medical advice for individual conditions or treatments  Talk to your doctor, nurse or pharmacist before following any medical regimen to see if it is safe and effective for you

## 2020-03-05 NOTE — PROGRESS NOTES
[facility]        NAME: Chace Magana is a 27 y o  female  : 1995    MRN: [id number]  DATE: 2020  TIME: 2:18 PM    Assessment and Plan   Achilles tendinitis of left lower extremity [M76 62]  1  Achilles tendinitis of left lower extremity  XR foot 3+ vw left    Cam Boot    Ambulatory referral to Orthopedic Surgery     Patient placed in a Cam boot  Patient referred to Orthopedics  Patient Instructions     Take medications as directed  Drink plenty of fluids  Follow up with family doctor this week  Go to ER immediately if new or worsening symptoms occur  Chief Complaint     Chief Complaint   Patient presents with    Ankle Pain     started about 3 days  pt is having pain in her ankle/achillies area  denies any falling or trauma to the area  History of Present Illness       Leg Pain    Incident onset: Atraumatic started 3 days ago  No recent change in activity  Pt is normally active and runs on treadmill but no new increase in activity  There was no injury mechanism  Pain location: Left achilles  The quality of the pain is described as aching  The pain is moderate  Pertinent negatives include no inability to bear weight, loss of sensation, muscle weakness, numbness or tingling  She reports no foreign bodies present  The symptoms are aggravated by weight bearing, movement and palpation  She has tried ice and NSAIDs for the symptoms  The treatment provided mild relief  Review of Systems   Review of Systems   Constitutional: Negative  Negative for chills, fatigue and fever  HENT: Negative  Eyes: Negative  Respiratory: Negative  Negative for chest tightness, shortness of breath and wheezing  Cardiovascular: Negative  Negative for chest pain and palpitations  Gastrointestinal: Negative for abdominal pain, constipation, diarrhea, nausea and vomiting  Endocrine: Negative      Genitourinary: Negative for dysuria, flank pain, frequency, pelvic pain, vaginal discharge and vaginal pain  Musculoskeletal: Positive for gait problem  Negative for back pain, neck pain and neck stiffness  Skin: Negative  Negative for pallor and rash  Allergic/Immunologic: Negative  Neurological: Negative for tingling, weakness and numbness  Hematological: Negative  Psychiatric/Behavioral: Negative  Current Medications       Current Outpatient Medications:     famotidine (PEPCID) 20 mg tablet, , Disp: , Rfl: 0    ibuprofen (MOTRIN) 400 mg tablet, Take by mouth once as needed  , Disp: , Rfl:     LESSINA 0 1-20 MG-MCG per tablet, take 1 tablet by mouth once daily, Disp: 84 tablet, Rfl: 4    pantoprazole (PROTONIX) 40 mg tablet, , Disp: , Rfl:     Current Allergies     Allergies as of 03/05/2020    (No Known Allergies)            The following portions of the patient's history were reviewed and updated as appropriate: allergies, current medications, past family history, past medical history, past social history, past surgical history and problem list      Past Medical History:   Diagnosis Date    GERD (gastroesophageal reflux disease)     Polycystic ovary syndrome        Past Surgical History:   Procedure Laterality Date    HIP SURGERY  2018    KNEE SURGERY      TOOTH EXTRACTION         Family History   Problem Relation Age of Onset    Diabetes Father     Diabetes Other         Paternal Half-Sister, Paternal Great-Grandmother    Diabetes Paternal Uncle     Cirrhosis Paternal Uncle     Diabetes Family          Medications have been verified  Objective   /81   Pulse 88   Temp 98 °F (36 7 °C) (Tympanic)   Resp 18   Ht 5' 3" (1 6 m)   Wt 59 6 kg (131 lb 6 4 oz)   SpO2 100%   BMI 23 28 kg/m²        Physical Exam     Physical Exam   Constitutional: She appears well-developed and well-nourished  No distress  HENT:   Head: Normocephalic and atraumatic     Cardiovascular: Normal rate, regular rhythm, normal heart sounds and intact distal pulses  Pulmonary/Chest: Effort normal and breath sounds normal  No respiratory distress  She has no wheezes  She has no rales  Musculoskeletal:        Left ankle: She exhibits normal range of motion, no swelling, no deformity and normal pulse  No tenderness  Achilles tendon exhibits pain  Achilles tendon exhibits no defect (No palpable defect or separation noted) and normal Ivory's test results  Left foot: There is tenderness  There is normal range of motion, no bony tenderness, no swelling and no deformity  Feet:    Skin: Skin is warm  Capillary refill takes less than 2 seconds  No rash noted  She is not diaphoretic  No pallor  Nursing note and vitals reviewed

## 2020-03-09 ENCOUNTER — OFFICE VISIT (OUTPATIENT)
Dept: OBGYN CLINIC | Facility: MEDICAL CENTER | Age: 25
End: 2020-03-09
Payer: COMMERCIAL

## 2020-03-09 VITALS
BODY MASS INDEX: 23.04 KG/M2 | HEART RATE: 69 BPM | HEIGHT: 63 IN | DIASTOLIC BLOOD PRESSURE: 73 MMHG | SYSTOLIC BLOOD PRESSURE: 109 MMHG | WEIGHT: 130 LBS

## 2020-03-09 DIAGNOSIS — M25.872 ANKLE IMPINGEMENT SYNDROME, LEFT: Primary | ICD-10-CM

## 2020-03-09 DIAGNOSIS — M21.41 PES PLANUS OF BOTH FEET: ICD-10-CM

## 2020-03-09 DIAGNOSIS — M76.62 ACHILLES TENDINITIS OF LEFT LOWER EXTREMITY: ICD-10-CM

## 2020-03-09 DIAGNOSIS — M21.42 PES PLANUS OF BOTH FEET: ICD-10-CM

## 2020-03-09 PROCEDURE — 99203 OFFICE O/P NEW LOW 30 MIN: CPT | Performed by: FAMILY MEDICINE

## 2020-03-09 NOTE — PROGRESS NOTES
1  Ankle impingement syndrome, left  [facility]     Physical Therapy   2  Achilles tendinitis of left lower extremity  Ambulatory referral to Orthopedic Surgery     Physical Therapy     Physical Therapy   3  Pes planus of both feet  Orthotics B/L     Physical Therapy     Orders Placed This Encounter   Procedures    Orthotics B/L     Physical Therapy     Physical Therapy        Imaging Studies (I personally reviewed images in PACS and report):  X-ray left foot 03/05/2020:  No acute osseous abnormality    IMPRESSION:  Left ankle lateral impingement syndrome  Left mild acute Achilles tendinitis  Pes planus bilateral      Repeat X-ray next visit:   None      Return if symptoms worsen or fail to improve  Patient Instructions   Explained the patient that she has evidence of lateral ankle impingement as well as developing Achilles tendinitis acutely  This is likely related to overuse syndrome from running and in the gym  Explained that she does have mild flat feet which is a risk factor for developing these issues  I recommended activity modification including transitioning from treadmill to exercise bicycle and also to start physical therapy as well as flat foot orthotics bilateral   If she does not have any improvement in the next 4-6 weeks or has worsening symptoms she is to return for repeat evaluation  Discussed other modalities of treatment including PRP injection but at this time early on in course she is not a candidate  CHIEF COMPLAINT:  Left foot ankle pain    HPI:  Marcela Beth is a 27 y o  female  who presents for       Visit 03/09/2020:  Evaluation of atraumatic left foot ankle pain ongoing for approximately 1 week  Patient is active runs every day in the gym 1 2 mild on treadmill  She denies any specific injury event  She points to lateral aspect of her ankle as source of sharp pain moderate intensity intermittent exacerbated by walking up and down stairs    She was seen urgent care clinic where she was diagnosed with Achilles tendinitis and given controlled ankle motion boot  Review of Systems   Constitutional: Negative for chills, fever and unexpected weight change  HENT: Negative for hearing loss, nosebleeds and sore throat  Eyes: Negative for pain, redness and visual disturbance  Respiratory: Negative for cough, shortness of breath and wheezing  Cardiovascular: Negative for chest pain, palpitations and leg swelling  Gastrointestinal: Negative for abdominal distention, nausea and vomiting  Endocrine: Negative for polydipsia and polyuria  Genitourinary: Negative for dysuria and hematuria  Skin: Negative for rash and wound  Neurological: Negative for dizziness, numbness and headaches  Psychiatric/Behavioral: Negative for decreased concentration and suicidal ideas           Following history reviewed and update:    Past Medical History:   Diagnosis Date    GERD (gastroesophageal reflux disease)     Polycystic ovary syndrome      Past Surgical History:   Procedure Laterality Date    HIP SURGERY  2018    KNEE SURGERY      TOOTH EXTRACTION       Social History   Social History     Substance and Sexual Activity   Alcohol Use Yes    Comment: occasionally     Social History     Substance and Sexual Activity   Drug Use No     Social History     Tobacco Use   Smoking Status Never Smoker   Smokeless Tobacco Never Used     Family History   Problem Relation Age of Onset    Diabetes Father     Diabetes Other         Paternal Half-Sister, Paternal Great-Grandmother    Diabetes Paternal Uncle     Cirrhosis Paternal Uncle     Diabetes Family      No Known Allergies       Physical Exam  /73   Pulse 69   Ht 5' 3" (1 6 m)   Wt 59 kg (130 lb)   BMI 23 03 kg/m²     Constitutional:  see vital signs  Gen: well-developed, normocephalic/atraumatic, well-groomed  Eyes: No inflammation or discharge of conjunctiva or lids; sclera clear   Pharynx: no inflammation, lesion, or mass of lips  Neck: supple, no masses, non-distended  MSK: no inflammation, lesion, mass, or clubbing of nails and digits except for other than mentioned below  SKIN: no visible rashes or skin lesions  Pulmonary/Chest: Effort normal  No respiratory distress     NEURO: cranial nerves grossly intact  PSYCH:  Alert and oriented to person, place, and time; recent and remote memory intact; mood normal, no depression, anxiety, or agitation, judgment and insight good and intact     Ortho Exam  LEFT ANKLE & FOOT EXAM  Observation  GAIT:  normal    Inspection  Erythema: no  Ecchymosis: no  Edema:  none      Tenderness  Proximal Fibula: no  (Maisonneuve frx)  AiTFL: no  (2cm proximal-medial to tip lateral malleolus 92% sens, 29% spec)  ATFL: +  CFL: no  PTFL: no  Achilles:  + mild critical zone  deltoid: No  Peroneal: no  Tibialis Anterior: no  Tibialis Posterior: no     Bony Tenderpoints:  Lateral Malleolus: no  Base of 5th MT: no  Medial Malleolus: no  Navicular: no  Talar dome: No    ROM  Dorsiflexion: intact  Plantarflexion: intact    Muscle Strength  Pronation: intact without pain  Supination: intact without pain    Tib-Fib Squeeze: negative  (amjqrnpjhlkp-wl-uekrkhcqzpoeed squeeze; 26% sens, 88% spec; rule in test)    Calcaneal Squeeze: negative    Dorsiflexion (+) ER Stress Test: negative  (reproduce ATiFL mech; 71% sens, 63% spec)      Left Calf exam:  No swelling erythema or increased warmth  No palpable cords  Tenderness: none  Lamar Dorsiflexion DVT Test: Negative  (slight knee flexion, passive abrupt ankle dorsiflexion, squeeze calf)    Left foot exam  No swelling, erythema or increased warmth  Tenderness: none  ROM Toes extension: intact  ROM Toes flexion: intact  Strength Toes: 5/5 flex, ext  Sensation intact  Capillary refill intact    Left Lisfranc Assessment:  Plantar Ecchymosis:  Negative  Pronation Abduction test:  Negative  (forefoot abducted, pronate foot, hindfoot kept in place)  Tarsometatarsal Squeeze test:  Negative  (thumb lateral midfoot, other fingers medial midfoot, squeeze 1st & 5th rays)  Single Limb Heel Rise:  (unilateral stand on "tip toe":)  Piano Key Test:  Negative  (hindfoot stabilized, passive dorsiflexion & plantar flexion at TMT joint)     LEFT ACHILLES EXAMINATION:  Simmonds Triad:  Palpable Gap or Defect of Achilles: none  Angle of Declination: angle of baseline plantarflexion symmetric to contralateral side  Matles Test (patient prone, intact and symmetric plantarflexion of ankle when flexing knee): intact  Bahena's Calf Squeeze Test: intact obligatory plantarflexion      Procedures

## 2020-03-09 NOTE — PATIENT INSTRUCTIONS
Explained the patient that she has evidence of lateral ankle impingement as well as developing Achilles tendinitis acutely  This is likely related to overuse syndrome from running and in the gym  Explained that she does have mild flat feet which is a risk factor for developing these issues  I recommended activity modification including transitioning from treadmill to exercise bicycle and also to start physical therapy as well as flat foot orthotics bilateral   If she does not have any improvement in the next 4-6 weeks or has worsening symptoms she is to return for repeat evaluation  Discussed other modalities of treatment including PRP injection but at this time early on in course she is not a candidate

## 2020-03-24 DIAGNOSIS — Z30.41 ENCOUNTER FOR SURVEILLANCE OF CONTRACEPTIVE PILLS: ICD-10-CM

## 2020-03-25 RX ORDER — LEVONORGESTREL AND ETHINYL ESTRADIOL 0.1-0.02MG
KIT ORAL
Qty: 84 TABLET | Refills: 4 | Status: SHIPPED | OUTPATIENT
Start: 2020-03-25 | End: 2020-11-03 | Stop reason: SDUPTHER

## 2020-08-01 ENCOUNTER — PREP FOR PROCEDURE (OUTPATIENT)
Dept: OTHER | Facility: HOSPITAL | Age: 25
End: 2020-08-01

## 2020-08-01 DIAGNOSIS — S83.8X2A ACUTE LATERAL MENISCAL INJURY OF LEFT KNEE, INITIAL ENCOUNTER: Primary | ICD-10-CM

## 2020-08-17 DIAGNOSIS — S83.8X2A ACUTE LATERAL MENISCAL INJURY OF LEFT KNEE, INITIAL ENCOUNTER: ICD-10-CM

## 2020-08-17 PROCEDURE — U0003 INFECTIOUS AGENT DETECTION BY NUCLEIC ACID (DNA OR RNA); SEVERE ACUTE RESPIRATORY SYNDROME CORONAVIRUS 2 (SARS-COV-2) (CORONAVIRUS DISEASE [COVID-19]), AMPLIFIED PROBE TECHNIQUE, MAKING USE OF HIGH THROUGHPUT TECHNOLOGIES AS DESCRIBED BY CMS-2020-01-R: HCPCS | Performed by: ORTHOPAEDIC SURGERY

## 2020-08-18 LAB — SARS-COV-2 RNA SPEC QL NAA+PROBE: NOT DETECTED

## 2020-09-21 ENCOUNTER — EVALUATION (OUTPATIENT)
Dept: PHYSICAL THERAPY | Facility: MEDICAL CENTER | Age: 25
End: 2020-09-21
Payer: COMMERCIAL

## 2020-09-21 DIAGNOSIS — Z98.890 S/P ARTHROSCOPY OF LEFT KNEE: ICD-10-CM

## 2020-09-21 DIAGNOSIS — M25.562 LEFT KNEE PAIN, UNSPECIFIED CHRONICITY: Primary | ICD-10-CM

## 2020-09-21 PROCEDURE — 97161 PT EVAL LOW COMPLEX 20 MIN: CPT | Performed by: PHYSICAL THERAPIST

## 2020-09-21 PROCEDURE — 97110 THERAPEUTIC EXERCISES: CPT | Performed by: PHYSICAL THERAPIST

## 2020-09-21 NOTE — LETTER
2020    Dana Hall MD  [address] [place] [address]    Patient: Roxanna Pinto   YOB: 1995   Date of Visit: 2020     Encounter Diagnosis     ICD-10-CM    1  Left knee pain, unspecified chronicity  M25 562    2  S/P arthroscopy of left knee  Z98 890        Dear Dr Tharon Sandhoff:    Thank you for your recent referral of Roxanna Pinto  Please review the attached evaluation summary from Cher's recent visit  Please verify that you agree with the plan of care by signing the attached order  If you have any questions or concerns, please do not hesitate to call  I sincerely appreciate the opportunity to share in the care of one of your patients and hope to have another opportunity to work with you in the near future  Sincerely,    Sanjeev Hdz, PT      Referring Provider:      I certify that I have read the below Plan of Care and certify the need for these services furnished under this plan of treatment while under my care  Dana Hall MD  [address] [place] [address],[address]: [phone]          PT Evaluation     Today's date: 2020  Patient name: Roxanna Pinto  : 1995  MRN: [id number]  Referring provider: Mc Edwards*  Dx:   Encounter Diagnosis     ICD-10-CM    1  Left knee pain, unspecified chronicity  M25 562    2  S/P arthroscopy of left knee  Z98 890          Assessment  Assessment details: Pt is a pleasant 22 yo female presenting to physical therapy s/p L knee arthroscopy  Pt would benefit from skilled PT to address current impairments and return pt to pre-morbid function      Understanding of Dx/Px/POC: good   Prognosis: good    Goals  Impairment Goals  - Pt I with initial HEP in 1-2 visits  - Improve ROM equal to contralateral side in 4-6 weeks  - Increase strength to 5/5 in all affected areas in 4-6 week    Functional Goals  - Increase FOTO to at least 73 in 6-8 weeks  - Patient will be independent with comprehensive HEP in 6-8 weeks  - Ambulation is improved to prior level of function in 6-8 weeks  - Stair climbing is improved to prior level of function in 6-8 weeks  - Squatting is improved to prior level of function in 6-8 weeks    Plan  Patient would benefit from: skilled physical therapy  Other planned modality interventions: Modalities prn  Planned therapy interventions: manual therapy, neuromuscular re-education, patient education, strengthening, stretching, therapeutic activities, therapeutic exercise and home exercise program  Frequency: 2x week  Duration in weeks: 8  Treatment plan discussed with: patient        Subjective Evaluation    History of Present Illness  Date of surgery: 2020  Mechanism of injury: Pt with a gradual onset of L knee pain  When she would bend down she had trouble getting back up  Pt eventually had an MRI which revealed a torn L meniscus  She had surgery 3 weeks ago and feels like she is doing well  Pain  Current pain ratin  At best pain ratin  At worst pain ratin    Social Support    Working: Recent college grad  Exercise history: general exercise at the gym, walking    Treatments  No previous or current treatments  Patient Goals  Patient goals for therapy: decreased pain, improved balance, increased motion, increased strength and return to sport/leisure activities          Objective     Observations     Additional Observation Details  + mild swelling    Gait: Slightly antalgic with decreased WB/stance time L LE    Balance: Decreased L LE        Neurological Testing     Sensation     Knee   Left Knee   Intact: light touch    Right Knee   Intact: light touch     Active Range of Motion   Left Knee   Flexion: 130 degrees   Extension: 0 degrees     Right Knee   Hyperextension   Flexion: 145 degrees     Mobility   Patellar Mobility:   Left Knee   WFL: medial, lateral, superior and inferior       Right Knee   WFL: medial, lateral, superior and inferior    Strength/Myotome Testing     Left Hip   Planes of Motion   Flexion: 3+  Extension: 3+  Abduction: 3+  Adduction: 3+    Right Hip   Planes of Motion   Flexion: 5  Extension: 5  Abduction: 5  Adduction: 5    Left Knee   Prone flexion: 3+  Extension: 4  Quadriceps contraction: fair    Right Knee   Prone flexion: 5  Extension: 5  Quadriceps contraction: good    General Comments:      Knee Comments  + HS, quad and gastroc tightness L       Flowsheet Rows      Most Recent Value   PT/OT G-Codes   Current Score  52   Projected Score  73             Precautions: s/p L knee arthroscopy       Ther ex 9/21            Bike 10'            HS str 3x30"            Gastroc str 3x30"            Quad str 3x30"            Heel slides with SO x30            Quad sets 5"  x50            SLR flexion 3x15            SLR ab, ad, ext 3x15            Prone TKE 5"  3x15            Standing HS curls 3x15

## 2020-09-21 NOTE — PROGRESS NOTES
PT Evaluation     Today's date: 2020  Patient name: Jonh Wilkerson  : 1995  MRN: [id number]  Referring provider: Charisma Mark  Dx:   Encounter Diagnosis     ICD-10-CM    1  Left knee pain, unspecified chronicity  M25 562    2  S/P arthroscopy of left knee  Z98 890          Assessment  Assessment details: Pt is a pleasant 22 yo female presenting to physical therapy s/p L knee arthroscopy  Pt would benefit from skilled PT to address current impairments and return pt to pre-morbid function  Understanding of Dx/Px/POC: good   Prognosis: good    Goals  Impairment Goals  - Pt I with initial HEP in 1-2 visits  - Improve ROM equal to contralateral side in 4-6 weeks  - Increase strength to 5/5 in all affected areas in 4-6 week    Functional Goals  - Increase FOTO to at least 73 in 6-8 weeks  - Patient will be independent with comprehensive HEP in 6-8 weeks  - Ambulation is improved to prior level of function in 6-8 weeks  - Stair climbing is improved to prior level of function in 6-8 weeks  - Squatting is improved to prior level of function in 6-8 weeks    Plan  Patient would benefit from: skilled physical therapy  Other planned modality interventions: Modalities prn  Planned therapy interventions: manual therapy, neuromuscular re-education, patient education, strengthening, stretching, therapeutic activities, therapeutic exercise and home exercise program  Frequency: 2x week  Duration in weeks: 8  Treatment plan discussed with: patient        Subjective Evaluation    History of Present Illness  Date of surgery: 2020  Mechanism of injury: Pt with a gradual onset of L knee pain  When she would bend down she had trouble getting back up  Pt eventually had an MRI which revealed a torn L meniscus  She had surgery 3 weeks ago and feels like she is doing well      Pain  Current pain ratin  At best pain ratin  At worst pain ratin    Social Support    Working: Recent college grad   Exercise history: general exercise at the gym, walking    Treatments  No previous or current treatments  Patient Goals  Patient goals for therapy: decreased pain, improved balance, increased motion, increased strength and return to sport/leisure activities          Objective     Observations     Additional Observation Details  + mild swelling    Gait: Slightly antalgic with decreased WB/stance time L LE    Balance: Decreased L LE        Neurological Testing     Sensation     Knee   Left Knee   Intact: light touch    Right Knee   Intact: light touch     Active Range of Motion   Left Knee   Flexion: 130 degrees   Extension: 0 degrees     Right Knee   Hyperextension   Flexion: 145 degrees     Mobility   Patellar Mobility:   Left Knee   WFL: medial, lateral, superior and inferior       Right Knee   WFL: medial, lateral, superior and inferior    Strength/Myotome Testing     Left Hip   Planes of Motion   Flexion: 3+  Extension: 3+  Abduction: 3+  Adduction: 3+    Right Hip   Planes of Motion   Flexion: 5  Extension: 5  Abduction: 5  Adduction: 5    Left Knee   Prone flexion: 3+  Extension: 4  Quadriceps contraction: fair    Right Knee   Prone flexion: 5  Extension: 5  Quadriceps contraction: good    General Comments:      Knee Comments  + HS, quad and gastroc tightness L       Flowsheet Rows      Most Recent Value   PT/OT G-Codes   Current Score  52   Projected Score  73             Precautions: s/p L knee arthroscopy       Ther ex 9/21            Bike 10'            HS str 3x30"            Gastroc str 3x30"            Quad str 3x30"            Heel slides with SO x30            Quad sets 5"  x50            SLR flexion 3x15            SLR ab, ad, ext 3x15            Prone TKE 5"  3x15            Standing HS curls 3x15

## 2020-09-21 NOTE — PROGRESS NOTES
A/P    1  Annual exam    Last PAP 2018- WNL    Next due 2021   Scheduling of pap discussed in detail     2  Weight gain    Last year was concerned for unexplained weight gain    Was up 12 pounds since 2016   TSH was checked and normal at that time     Today was up again      She was concerned ocp even though on x 5 years and this is new thing      Today weight is 140 (9 pounds since March    Last weight recorded was 130 (3/219)      Birth control - we will stop it and give a few months to see what happens if the weight is better controlled or still increasing   Will also see if cycles are regular and consider a pcos workup if are        25 y o ,No LMP recorded  (Menstrual status: Birth Control)  C/O weight gain is the only concern see above       Past medical / social / surgical / family history reviewed and updated   Medication and allergies discussed in detail and updated     Review of Systems - Negative except   General ROS: positive for  - weight gain      Physical Exam  Vitals signs reviewed  Constitutional:       Appearance: She is well-developed  Neck:      Musculoskeletal: Normal range of motion  Thyroid: No thyromegaly  Cardiovascular:      Rate and Rhythm: Normal rate  Heart sounds: Normal heart sounds  Pulmonary:      Effort: Pulmonary effort is normal  No accessory muscle usage or respiratory distress  Chest:      Chest wall: No tenderness  Breasts: Breasts are symmetrical          Right: No inverted nipple, mass or tenderness  Left: No inverted nipple, mass or tenderness  Abdominal:      General: There is no distension  Palpations: Abdomen is soft  Tenderness: There is no abdominal tenderness  There is no guarding or rebound  Genitourinary:     Exam position: Supine  Labia:         Right: No rash, tenderness, lesion or injury  Left: No rash, tenderness, lesion or injury  Vagina: Normal  No foreign body   No vaginal discharge or bleeding  Cervix: No cervical motion tenderness or discharge  Uterus: Not enlarged and not fixed  Adnexa:         Right: No mass, tenderness or fullness  Left: No mass, tenderness or fullness  Rectum: No external hemorrhoid  Lymphadenopathy:      Cervical: No cervical adenopathy  Upper Body:      Right upper body: No supraclavicular adenopathy  Left upper body: No supraclavicular adenopathy  Neurological:      Mental Status: She is alert and oriented to person, place, and time  Psychiatric:         Speech: Speech normal          Behavior: Behavior normal          Thought Content:  Thought content normal          Judgment: Judgment normal

## 2020-09-22 ENCOUNTER — TRANSCRIBE ORDERS (OUTPATIENT)
Dept: PHYSICAL THERAPY | Facility: MEDICAL CENTER | Age: 25
End: 2020-09-22

## 2020-09-22 DIAGNOSIS — M25.562 LEFT KNEE PAIN, UNSPECIFIED CHRONICITY: Primary | ICD-10-CM

## 2020-09-22 DIAGNOSIS — Z98.890 S/P ARTHROSCOPY OF LEFT KNEE: ICD-10-CM

## 2020-09-23 ENCOUNTER — OFFICE VISIT (OUTPATIENT)
Dept: PHYSICAL THERAPY | Facility: MEDICAL CENTER | Age: 25
End: 2020-09-23
Payer: COMMERCIAL

## 2020-09-23 ENCOUNTER — ANNUAL EXAM (OUTPATIENT)
Dept: OBGYN CLINIC | Facility: MEDICAL CENTER | Age: 25
End: 2020-09-23
Payer: COMMERCIAL

## 2020-09-23 VITALS
BODY MASS INDEX: 24.8 KG/M2 | TEMPERATURE: 97.3 F | HEIGHT: 63 IN | DIASTOLIC BLOOD PRESSURE: 70 MMHG | WEIGHT: 140 LBS | SYSTOLIC BLOOD PRESSURE: 102 MMHG

## 2020-09-23 DIAGNOSIS — Z98.890 S/P ARTHROSCOPY OF LEFT KNEE: ICD-10-CM

## 2020-09-23 DIAGNOSIS — M25.562 LEFT KNEE PAIN, UNSPECIFIED CHRONICITY: Primary | ICD-10-CM

## 2020-09-23 DIAGNOSIS — Z01.419 WOMEN'S ANNUAL ROUTINE GYNECOLOGICAL EXAMINATION: Primary | ICD-10-CM

## 2020-09-23 PROCEDURE — 97110 THERAPEUTIC EXERCISES: CPT

## 2020-09-23 PROCEDURE — 97112 NEUROMUSCULAR REEDUCATION: CPT

## 2020-09-23 PROCEDURE — 99395 PREV VISIT EST AGE 18-39: CPT | Performed by: OBSTETRICS & GYNECOLOGY

## 2020-09-23 RX ORDER — SUMATRIPTAN 50 MG/1
TABLET, FILM COATED ORAL
COMMUNITY
Start: 2020-07-29

## 2020-09-23 NOTE — PROGRESS NOTES
Daily Note     Today's date: 2020  Patient name: Abelardo Sinclair  : 1995  MRN: [id number]  Referring provider: Melissa Simon*  Dx:   Encounter Diagnosis     ICD-10-CM    1  Left knee pain, unspecified chronicity  M25 562    2  S/P arthroscopy of left knee  Z98 890                   Subjective: Pt reports that her knee feels pretty good since the IE  Pt was able to ride the stationary bike at the gym for 30 min yesterday without increased soreness  Objective: See treatment diary below      Assessment: Tolerated treatment well  Patient demonstrated fatigue post treatment, exhibited good technique with therapeutic exercises and would benefit from continued PT  Pt responding well to current tx plan  Progress at [facility] if able  Plan: Continue per plan of care        Precautions: s/p L knee arthroscopy       Ther ex            Bike 10' 10'           HS str 3x30" 3x30"           Gastroc str 3x30" 3x30"           Quad str 3x30" 3x30"           Heel slides with SO x30 x30           Quad sets 5"  x50 5"  x50           SLR flexion 3x15 3x15           SLR ab, ad, ext 3x15 3x15           Prone TKE 5"  3x15 5"  3x15           Standing HS curls 3x15 3x15

## 2020-09-29 ENCOUNTER — APPOINTMENT (OUTPATIENT)
Dept: PHYSICAL THERAPY | Facility: MEDICAL CENTER | Age: 25
End: 2020-09-29
Payer: COMMERCIAL

## 2020-10-01 ENCOUNTER — APPOINTMENT (OUTPATIENT)
Dept: PHYSICAL THERAPY | Facility: MEDICAL CENTER | Age: 25
End: 2020-10-01
Payer: COMMERCIAL

## 2020-10-06 ENCOUNTER — APPOINTMENT (OUTPATIENT)
Dept: PHYSICAL THERAPY | Facility: MEDICAL CENTER | Age: 25
End: 2020-10-06
Payer: COMMERCIAL

## 2020-10-08 ENCOUNTER — APPOINTMENT (OUTPATIENT)
Dept: PHYSICAL THERAPY | Facility: MEDICAL CENTER | Age: 25
End: 2020-10-08
Payer: COMMERCIAL

## 2020-10-08 ENCOUNTER — OFFICE VISIT (OUTPATIENT)
Dept: PHYSICAL THERAPY | Facility: MEDICAL CENTER | Age: 25
End: 2020-10-08
Payer: COMMERCIAL

## 2020-10-08 DIAGNOSIS — Z98.890 S/P ARTHROSCOPY OF LEFT KNEE: Primary | ICD-10-CM

## 2020-10-08 DIAGNOSIS — M25.562 LEFT KNEE PAIN, UNSPECIFIED CHRONICITY: ICD-10-CM

## 2020-10-08 PROCEDURE — 97110 THERAPEUTIC EXERCISES: CPT | Performed by: PHYSICAL THERAPIST

## 2020-10-08 PROCEDURE — 97112 NEUROMUSCULAR REEDUCATION: CPT | Performed by: PHYSICAL THERAPIST

## 2020-10-12 ENCOUNTER — APPOINTMENT (OUTPATIENT)
Dept: PHYSICAL THERAPY | Facility: MEDICAL CENTER | Age: 25
End: 2020-10-12
Payer: COMMERCIAL

## 2020-10-15 ENCOUNTER — APPOINTMENT (OUTPATIENT)
Dept: PHYSICAL THERAPY | Facility: MEDICAL CENTER | Age: 25
End: 2020-10-15
Payer: COMMERCIAL

## 2020-10-20 ENCOUNTER — APPOINTMENT (OUTPATIENT)
Dept: PHYSICAL THERAPY | Facility: MEDICAL CENTER | Age: 25
End: 2020-10-20
Payer: COMMERCIAL

## 2020-10-22 ENCOUNTER — APPOINTMENT (OUTPATIENT)
Dept: PHYSICAL THERAPY | Facility: MEDICAL CENTER | Age: 25
End: 2020-10-22
Payer: COMMERCIAL

## 2020-10-27 ENCOUNTER — APPOINTMENT (OUTPATIENT)
Dept: PHYSICAL THERAPY | Facility: MEDICAL CENTER | Age: 25
End: 2020-10-27
Payer: COMMERCIAL

## 2020-10-29 ENCOUNTER — APPOINTMENT (OUTPATIENT)
Dept: PHYSICAL THERAPY | Facility: MEDICAL CENTER | Age: 25
End: 2020-10-29
Payer: COMMERCIAL

## 2020-11-03 ENCOUNTER — OFFICE VISIT (OUTPATIENT)
Dept: OBGYN CLINIC | Facility: MEDICAL CENTER | Age: 25
End: 2020-11-03
Payer: COMMERCIAL

## 2020-11-03 VITALS
BODY MASS INDEX: 25.12 KG/M2 | SYSTOLIC BLOOD PRESSURE: 102 MMHG | TEMPERATURE: 97.4 F | DIASTOLIC BLOOD PRESSURE: 74 MMHG | WEIGHT: 141.8 LBS | HEIGHT: 63 IN

## 2020-11-03 DIAGNOSIS — Z30.41 ENCOUNTER FOR SURVEILLANCE OF CONTRACEPTIVE PILLS: ICD-10-CM

## 2020-11-03 DIAGNOSIS — Z30.011 ENCOUNTER FOR INITIAL PRESCRIPTION OF CONTRACEPTIVE PILLS: ICD-10-CM

## 2020-11-03 DIAGNOSIS — R10.2 PELVIC PAIN: Primary | ICD-10-CM

## 2020-11-03 PROCEDURE — 99214 OFFICE O/P EST MOD 30 MIN: CPT | Performed by: OBSTETRICS & GYNECOLOGY

## 2020-11-03 RX ORDER — LEVONORGESTREL AND ETHINYL ESTRADIOL 0.1-0.02MG
1 KIT ORAL DAILY
Qty: 84 TABLET | Refills: 4 | Status: SHIPPED | OUTPATIENT
Start: 2020-11-03

## 2020-11-11 ENCOUNTER — TELEPHONE (OUTPATIENT)
Dept: OBGYN CLINIC | Facility: MEDICAL CENTER | Age: 25
End: 2020-11-11

## 2020-11-17 PROCEDURE — 11981 INSERTION DRUG DLVR IMPLANT: CPT | Performed by: OBSTETRICS & GYNECOLOGY

## 2020-11-19 ENCOUNTER — PROCEDURE VISIT (OUTPATIENT)
Dept: OBGYN CLINIC | Facility: MEDICAL CENTER | Age: 25
End: 2020-11-19
Payer: COMMERCIAL

## 2020-11-19 VITALS
WEIGHT: 142.2 LBS | SYSTOLIC BLOOD PRESSURE: 118 MMHG | TEMPERATURE: 98.5 F | DIASTOLIC BLOOD PRESSURE: 78 MMHG | HEIGHT: 63 IN | BODY MASS INDEX: 25.2 KG/M2

## 2020-11-19 DIAGNOSIS — Z30.017 NEXPLANON INSERTION: Primary | ICD-10-CM

## 2020-11-19 RX ORDER — FAMOTIDINE 20 MG/1
20 TABLET, FILM COATED ORAL
COMMUNITY
Start: 2020-11-12

## 2020-12-09 ENCOUNTER — NURSE TRIAGE (OUTPATIENT)
Dept: OTHER | Facility: OTHER | Age: 25
End: 2020-12-09

## 2021-04-09 DIAGNOSIS — Z23 ENCOUNTER FOR IMMUNIZATION: ICD-10-CM

## 2021-05-07 ENCOUNTER — TRANSCRIBE ORDERS (OUTPATIENT)
Dept: URGENT CARE | Age: 26
End: 2021-05-07

## 2021-05-07 ENCOUNTER — OCCMED (OUTPATIENT)
Dept: URGENT CARE | Age: 26
End: 2021-05-07

## 2021-05-07 ENCOUNTER — APPOINTMENT (OUTPATIENT)
Dept: LAB | Age: 26
End: 2021-05-07

## 2021-05-07 DIAGNOSIS — Z02.89 ENCOUNTER FOR OCCUPATIONAL HEALTH ASSESSMENT: Primary | ICD-10-CM

## 2021-05-07 DIAGNOSIS — Z00.8 SPECIAL EXAMINATIONS: ICD-10-CM

## 2021-05-07 DIAGNOSIS — Z00.8 SPECIAL EXAMINATIONS: Primary | ICD-10-CM

## 2021-05-07 PROCEDURE — 36415 COLL VENOUS BLD VENIPUNCTURE: CPT

## 2021-05-07 PROCEDURE — 86480 TB TEST CELL IMMUN MEASURE: CPT

## 2021-05-10 LAB
GAMMA INTERFERON BACKGROUND BLD IA-ACNC: 0.04 IU/ML
M TB IFN-G BLD-IMP: NEGATIVE
M TB IFN-G CD4+ BCKGRND COR BLD-ACNC: -0.01 IU/ML
M TB IFN-G CD4+ BCKGRND COR BLD-ACNC: -0.01 IU/ML
MITOGEN IGNF BCKGRD COR BLD-ACNC: >10 IU/ML

## 2021-05-19 ENCOUNTER — OFFICE VISIT (OUTPATIENT)
Dept: OBGYN CLINIC | Facility: MEDICAL CENTER | Age: 26
End: 2021-05-19
Payer: COMMERCIAL

## 2021-05-19 VITALS — SYSTOLIC BLOOD PRESSURE: 100 MMHG | BODY MASS INDEX: 26.04 KG/M2 | WEIGHT: 147 LBS | DIASTOLIC BLOOD PRESSURE: 60 MMHG

## 2021-05-19 DIAGNOSIS — R63.5 WEIGHT GAIN: Primary | ICD-10-CM

## 2021-05-19 PROCEDURE — 99212 OFFICE O/P EST SF 10 MIN: CPT | Performed by: OBSTETRICS & GYNECOLOGY

## 2021-05-19 RX ORDER — NORTRIPTYLINE HYDROCHLORIDE 10 MG/1
10 CAPSULE ORAL
COMMUNITY
Start: 2021-04-06

## 2021-05-19 RX ORDER — OMEPRAZOLE 40 MG/1
40 CAPSULE, DELAYED RELEASE ORAL 2 TIMES DAILY
COMMUNITY
Start: 2021-05-06

## 2021-05-19 RX ORDER — ETONOGESTREL 68 MG/1
68 IMPLANT SUBCUTANEOUS ONCE
COMMUNITY

## 2021-05-19 NOTE — ASSESSMENT & PLAN NOTE
Weight gain -  About 1 year ago was 137 currently in 147  Since the neplanon was placed she has gained about 7 pound she has changed her whole diet  She has stopped eating much fish or carbs and states that she is exercising and still gaining      We went over the possible reasons,   1  PCOS - probably not high on the list, she had regular cycles  Will get insulin fasting level - if elevated will give metformin     2  Nexplanon- this is a possibility - since the weight gain has been higher since the santiago placed    Will watch and if still increasing will remove it and get a new birth control     Pt understands the plan and is in agreement

## 2021-05-19 NOTE — PROGRESS NOTES
Problem List Items Addressed This Visit        Other    Weight gain - Primary     Weight gain -  About 1 year ago was 80 currently in 147  Since the neplanon was placed she has gained about 7 pound she has changed her whole diet  She has stopped eating much fish or carbs and states that she is exercising and still gaining      We went over the possible reasons,   1  PCOS - probably not high on the list, she had regular cycles  Will get insulin fasting level - if elevated will give metformin     2  Nexplanon- this is a possibility - since the weight gain has been higher since the santiago placed    Will watch and if still increasing will remove it and get a new birth control     Pt understands the plan and is in agreement            Relevant Orders    Insulin, fasting

## 2021-05-21 ENCOUNTER — APPOINTMENT (OUTPATIENT)
Dept: LAB | Age: 26
End: 2021-05-21
Payer: COMMERCIAL

## 2021-05-21 DIAGNOSIS — R63.5 WEIGHT GAIN: ICD-10-CM

## 2021-05-21 LAB — INSULIN SERPL-ACNC: 9.6 MU/L (ref 3–25)

## 2021-05-21 PROCEDURE — 36415 COLL VENOUS BLD VENIPUNCTURE: CPT

## 2021-05-21 PROCEDURE — 83525 ASSAY OF INSULIN: CPT

## 2024-08-29 ENCOUNTER — APPOINTMENT (OUTPATIENT)
Dept: LAB | Facility: MEDICAL CENTER | Age: 29
End: 2024-08-29
Attending: PHYSICIAN ASSISTANT

## 2024-08-29 ENCOUNTER — OCCMED (OUTPATIENT)
Dept: URGENT CARE | Facility: MEDICAL CENTER | Age: 29
End: 2024-08-29

## 2024-08-29 DIAGNOSIS — Z02.1 PHYSICAL EXAM, PRE-EMPLOYMENT: Primary | ICD-10-CM

## 2024-08-29 LAB
MEV IGG SER QL IA: NORMAL
MUV IGG SER QL IA: NORMAL
RUBV IGG SERPL IA-ACNC: 69.4 IU/ML
VZV IGG SER QL IA: NORMAL

## 2024-08-29 PROCEDURE — 86480 TB TEST CELL IMMUN MEASURE: CPT | Performed by: PHYSICIAN ASSISTANT

## 2024-08-29 PROCEDURE — 86787 VARICELLA-ZOSTER ANTIBODY: CPT | Performed by: PHYSICIAN ASSISTANT

## 2024-08-29 PROCEDURE — 86765 RUBEOLA ANTIBODY: CPT | Performed by: PHYSICIAN ASSISTANT

## 2024-08-29 PROCEDURE — 86735 MUMPS ANTIBODY: CPT | Performed by: PHYSICIAN ASSISTANT

## 2024-08-29 PROCEDURE — 86762 RUBELLA ANTIBODY: CPT | Performed by: PHYSICIAN ASSISTANT

## 2024-08-29 PROCEDURE — 36415 COLL VENOUS BLD VENIPUNCTURE: CPT | Performed by: PHYSICIAN ASSISTANT

## 2024-08-30 LAB
GAMMA INTERFERON BACKGROUND BLD IA-ACNC: 0.02 IU/ML
M TB IFN-G BLD-IMP: NEGATIVE
M TB IFN-G CD4+ BCKGRND COR BLD-ACNC: -0.01 IU/ML
M TB IFN-G CD4+ BCKGRND COR BLD-ACNC: 0 IU/ML
MITOGEN IGNF BCKGRD COR BLD-ACNC: 9.98 IU/ML

## 2025-01-09 ENCOUNTER — OFFICE VISIT (OUTPATIENT)
Dept: URGENT CARE | Age: 30
End: 2025-01-09
Payer: COMMERCIAL

## 2025-01-09 VITALS
DIASTOLIC BLOOD PRESSURE: 68 MMHG | HEART RATE: 87 BPM | HEIGHT: 64 IN | RESPIRATION RATE: 18 BRPM | WEIGHT: 140.2 LBS | TEMPERATURE: 98.6 F | OXYGEN SATURATION: 98 % | BODY MASS INDEX: 23.93 KG/M2 | SYSTOLIC BLOOD PRESSURE: 106 MMHG

## 2025-01-09 DIAGNOSIS — J40 SINOBRONCHITIS: Primary | ICD-10-CM

## 2025-01-09 DIAGNOSIS — J32.9 SINOBRONCHITIS: Primary | ICD-10-CM

## 2025-01-09 PROCEDURE — G0382 LEV 3 HOSP TYPE B ED VISIT: HCPCS | Performed by: EMERGENCY MEDICINE

## 2025-01-09 PROCEDURE — S9083 URGENT CARE CENTER GLOBAL: HCPCS | Performed by: EMERGENCY MEDICINE

## 2025-01-09 RX ORDER — ALBUTEROL SULFATE 90 UG/1
2 INHALANT RESPIRATORY (INHALATION) EVERY 6 HOURS PRN
Qty: 8.5 G | Refills: 0 | Status: SHIPPED | OUTPATIENT
Start: 2025-01-09

## 2025-01-09 RX ORDER — PREDNISONE 20 MG/1
40 TABLET ORAL DAILY
Qty: 10 TABLET | Refills: 0 | Status: SHIPPED | OUTPATIENT
Start: 2025-01-09 | End: 2025-01-14

## 2025-01-09 NOTE — LETTER
January 9, 2025     Patient: Cher Patricia   YOB: 1995   Date of Visit: 1/9/2025       To Whom It May Concern:    It is my medical opinion that Cher Patricia may return to work on 1/13/25 if symptoms are improving.          Sincerely,        DANIEL Chu    CC: No Recipients

## 2025-01-10 NOTE — PATIENT INSTRUCTIONS
Take antibiotic as prescribed. Recommend probiotic use while taking antibiotic.   Take steroids as directed. Recommend to take them in the morning and with food.   Use albuterol as directed, as needed for shortness of breath and wheezing.  Mucinex OTC.  Recommend humidifier.   Acetaminophen or ibuprofen for fever and pain. Follow-up with PCP in 3-5 days. Go to ER if symptoms worsen.

## 2025-01-10 NOTE — PROGRESS NOTES
[facility]        NAME: Cher Patricia is a 29 y.o. female  : 1995    MRN: [id number]  DATE: 2025  TIME: 7:06 PM      Assessment and Plan     No primary diagnosis found.  No diagnosis found.      Patient Instructions     Follow up with PCP in 3-5 days.  Proceed to  ER if symptoms worsen.    Chief Complaint     Chief Complaint   Patient presents with    Cough    Nasal Congestion    chest congestion     C/o nasal congestion started 2 weeks ago  Cough started 5 days ago, reports that cough is dry and can feel congestion in her chest.          History of Present Illness     Cough  Associated symptoms include chills and wheezing. Pertinent negatives include no fever or sore throat.       Review of Systems     Review of Systems   Constitutional:  Positive for chills. Negative for fever.   HENT:  Positive for congestion, sinus pressure and sinus pain. Negative for sore throat.    Respiratory:  Positive for cough and wheezing.    Gastrointestinal:  Negative for diarrhea, nausea and vomiting.   All other systems reviewed and are negative.        Current Medications       Current Outpatient Medications:     etonogestrel (Nexplanon) subdermal implant, 68 mg by Subdermal route once, Disp: , Rfl:     famotidine (PEPCID) 20 mg tablet, Take 20 mg by mouth daily at bedtime, Disp: , Rfl:     ibuprofen (MOTRIN) 400 mg tablet, Take by mouth once as needed  , Disp: , Rfl:     levonorgestrel-ethinyl estradiol (Lessina) 0.1-20 MG-MCG per tablet, Take 1 tablet by mouth daily (Patient not taking: Reported on 2021), Disp: 84 tablet, Rfl: 4    nortriptyline (PAMELOR) 10 mg capsule, Take 10 mg by mouth daily at bedtime, Disp: , Rfl:     omeprazole (PriLOSEC) 40 MG capsule, Take 40 mg by mouth 2 (two) times a day, Disp: , Rfl:     pantoprazole (PROTONIX) 40 mg tablet, , Disp: , Rfl:     SUMAtriptan (IMITREX) 50 mg tablet, , Disp: , Rfl:     Current Allergies     Allergies as of 2025    (No Known  "Allergies)              The following portions of the patient's history were reviewed and updated as appropriate: allergies, current medications, past family history, past medical history, past social history, past surgical history and problem list.     Past Medical History:   Diagnosis Date    GERD (gastroesophageal reflux disease)     Polycystic ovary syndrome        Past Surgical History:   Procedure Laterality Date    HIP SURGERY  2018    KNEE SURGERY      TOOTH EXTRACTION         Family History   Problem Relation Age of Onset    Diabetes Father     Diabetes Other         Paternal Half-Sister, Paternal Great-Grandmother    Diabetes Paternal Uncle     Cirrhosis Paternal Uncle     Diabetes Family          Medications have been verified.        Objective     /68   Pulse 87   Temp 98.6 °F (37 °C) (Tympanic)   Resp 18   Ht 5' 4\" (1.626 m)   Wt 63.6 kg (140 lb 3.2 oz)   SpO2 98%   BMI 24.07 kg/m²   No LMP recorded.         Physical Exam     Physical Exam  " appearance. She is not ill-appearing, toxic-appearing or diaphoretic.   HENT:      Right Ear: Tympanic membrane, ear canal and external ear normal.      Left Ear: Tympanic membrane, ear canal and external ear normal.      Nose: Congestion present.      Mouth/Throat:      Lips: Pink.      Mouth: Mucous membranes are moist.      Pharynx: Oropharynx is clear. Uvula midline.   Cardiovascular:      Rate and Rhythm: Normal rate.      Pulses: Normal pulses.      Heart sounds: Normal heart sounds, S1 normal and S2 normal.   Pulmonary:      Effort: Pulmonary effort is normal.      Breath sounds: Normal breath sounds and air entry.   Skin:     General: Skin is warm.      Capillary Refill: Capillary refill takes less than 2 seconds.   Neurological:      Mental Status: She is alert.   Psychiatric:         Mood and Affect: Mood normal.         Behavior: Behavior normal.         Thought Content: Thought content normal.         Judgment: Judgment normal.

## 2025-01-16 ENCOUNTER — OFFICE VISIT (OUTPATIENT)
Dept: OBGYN CLINIC | Facility: CLINIC | Age: 30
End: 2025-01-16
Payer: COMMERCIAL

## 2025-01-16 VITALS
HEIGHT: 64 IN | SYSTOLIC BLOOD PRESSURE: 102 MMHG | BODY MASS INDEX: 24.07 KG/M2 | DIASTOLIC BLOOD PRESSURE: 64 MMHG | WEIGHT: 141 LBS

## 2025-01-16 DIAGNOSIS — N92.6 IRREGULAR MENSES: Primary | ICD-10-CM

## 2025-01-16 PROCEDURE — 99203 OFFICE O/P NEW LOW 30 MIN: CPT

## 2025-01-16 NOTE — PROGRESS NOTES
Name: Cher Patricia      : 1995      MRN: [id number]  Encounter Provider: DANIEL Patel  Encounter Date: 2025   Encounter department: [facility] [place]  :  Assessment & Plan  Irregular menses  -Blood work and TVUS ordered for irregular menses.  -Counseled patient that irregular menses can be common with Nexplanon and menses.  -Offered DEBBIE./Ring to stabilize lining. Patient will consider based on test results.   Orders:    CBC and differential; Future    TSH, 3rd generation with Free T4 reflex; Future    Iron Panel (Includes Ferritin, Iron Sat%, Iron, and TIBC); Future    US pelvis complete w transvaginal; Future        History of Present Illness   HPI  Cher Patricia is a 29 y.o. female who presents for menstrual concerns.    She has Nexplanon in place since .   She reports she never had bleeding with Nexplanon, just intermittent spotting until November.  She reports irregular menses started in November.  She reports 2 periods in a month since November.  She will have bleeding that lasts 5 days and then a break x 3 days. She will start bleeding again for a full, normal full.   She reports this recent bleeding is bright red with multiple clots.   Denies cramping.  Denies pelvic pain.  Reports bloating.   She denies changes in medication, lifestyle, and weight changes.   She routinely exercises.   She reports history of PCOS; has tried management with pills and ring but didn't work out.     She is sexually active, one month ago; no new partners.   She utilizes condoms.  Declines STI testing.    History obtained from: patient    Review of Systems   Constitutional:  Negative for fatigue.   Eyes:  Negative for photophobia and visual disturbance.   Respiratory:  Negative for cough and shortness of breath.    Cardiovascular:  Negative for chest pain and palpitations.   Gastrointestinal:  Negative for abdominal pain, blood in stool, constipation, diarrhea, nausea and  rectal pain.   Genitourinary:  Positive for menstrual problem. Negative for dyspareunia, dysuria, flank pain, frequency, genital sores, pelvic pain, urgency, vaginal bleeding, vaginal discharge and vaginal pain.   Musculoskeletal:  Negative for arthralgias and back pain.   Skin:  Negative for rash.   Neurological:  Negative for weakness and headaches.     Medical History Reviewed by provider this encounter:     .  Current Outpatient Medications on File Prior to Visit   Medication Sig Dispense Refill    albuterol (ProAir HFA) 90 mcg/act inhaler Inhale 2 puffs every 6 (six) hours as needed for wheezing 8.5 g 0    amoxicillin-clavulanate (AUGMENTIN) 875-125 mg per tablet Take 1 tablet by mouth every 12 (twelve) hours for 7 days 14 tablet 0    etonogestrel (Nexplanon) subdermal implant 68 mg by Subdermal route once      ibuprofen (MOTRIN) 400 mg tablet Take by mouth once as needed        famotidine (PEPCID) 20 mg tablet Take 20 mg by mouth daily at bedtime (Patient not taking: Reported on 1/16/2025)      levonorgestrel-ethinyl estradiol (Lessina) 0.1-20 MG-MCG per tablet Take 1 tablet by mouth daily (Patient not taking: Reported on 1/16/2025) 84 tablet 4    nortriptyline (PAMELOR) 10 mg capsule Take 10 mg by mouth daily at bedtime (Patient not taking: Reported on 1/16/2025)      omeprazole (PriLOSEC) 40 MG capsule Take 40 mg by mouth 2 (two) times a day (Patient not taking: Reported on 1/16/2025)      pantoprazole (PROTONIX) 40 mg tablet  (Patient not taking: Reported on 1/16/2025)      SUMAtriptan (IMITREX) 50 mg tablet  (Patient not taking: Reported on 1/16/2025)       No current facility-administered medications on file prior to visit.      Social History     Tobacco Use    Smoking status: Never    Smokeless tobacco: Never   Vaping Use    Vaping status: Never Used   Substance and Sexual Activity    Alcohol use: Yes     Comment: occasionally    Drug use: No    Sexual activity: Yes     Partners: Male     Birth  "control/protection: Pill, Condom        Objective   /64 (BP Location: Left arm, Patient Position: Sitting, Cuff Size: Standard)   Ht 5' 4\" (1.626 m)   Wt 64 kg (141 lb)   LMP 01/13/2025 (Exact Date)   BMI 24.20 kg/m²      Physical Exam  Vitals and nursing note reviewed.   Constitutional:       General: She is not in acute distress.     Appearance: Normal appearance.   HENT:      Head: Normocephalic.   Eyes:      Conjunctiva/sclera: Conjunctivae normal.   Pulmonary:      Effort: Pulmonary effort is normal. No respiratory distress.   Abdominal:      General: Abdomen is flat.      Palpations: Abdomen is soft.   Genitourinary:     General: Normal vulva.      Exam position: Lithotomy position.      Pubic Area: No rash or pubic lice.       Labia:         Right: No rash or tenderness.         Left: No rash or tenderness.       Vagina: Normal.      Cervix: Normal.      Uterus: Normal.       Adnexa: Right adnexa normal.        Left: Tenderness present.    Musculoskeletal:         General: Normal range of motion.      Cervical back: Normal range of motion.      Right lower leg: No edema.      Left lower leg: No edema.   Lymphadenopathy:      Lower Body: No right inguinal adenopathy. No left inguinal adenopathy.   Skin:     General: Skin is warm and dry.   Neurological:      Mental Status: She is alert and oriented to person, place, and time.   Psychiatric:         Mood and Affect: Mood normal.         Behavior: Behavior normal.         Thought Content: Thought content normal.         Judgment: Judgment normal.           "

## 2025-06-13 ENCOUNTER — APPOINTMENT (OUTPATIENT)
Dept: LAB | Age: 30
End: 2025-06-13
Payer: COMMERCIAL

## 2025-06-13 ENCOUNTER — RESULTS FOLLOW-UP (OUTPATIENT)
Dept: OBGYN CLINIC | Facility: CLINIC | Age: 30
End: 2025-06-13

## 2025-06-13 DIAGNOSIS — N92.6 IRREGULAR MENSES: ICD-10-CM

## 2025-06-13 LAB
FERRITIN SERPL-MCNC: 38 NG/ML (ref 30–307)
IRON SATN MFR SERPL: 30 % (ref 15–50)
IRON SERPL-MCNC: 113 UG/DL (ref 50–212)
TIBC SERPL-MCNC: 372.4 UG/DL (ref 250–450)
TRANSFERRIN SERPL-MCNC: 266 MG/DL (ref 203–362)
TSH SERPL DL<=0.05 MIU/L-ACNC: 0.86 UIU/ML (ref 0.45–4.5)
UIBC SERPL-MCNC: 259 UG/DL (ref 155–355)

## 2025-06-13 PROCEDURE — 83540 ASSAY OF IRON: CPT

## 2025-06-13 PROCEDURE — 83550 IRON BINDING TEST: CPT

## 2025-06-13 PROCEDURE — 84443 ASSAY THYROID STIM HORMONE: CPT

## 2025-06-13 PROCEDURE — 82728 ASSAY OF FERRITIN: CPT

## 2025-06-13 PROCEDURE — 36415 COLL VENOUS BLD VENIPUNCTURE: CPT

## 2025-08-05 ENCOUNTER — OFFICE VISIT (OUTPATIENT)
Dept: FAMILY MEDICINE CLINIC | Facility: CLINIC | Age: 30
End: 2025-08-05
Payer: COMMERCIAL

## 2025-08-05 VITALS
SYSTOLIC BLOOD PRESSURE: 118 MMHG | BODY MASS INDEX: 25.44 KG/M2 | HEART RATE: 71 BPM | DIASTOLIC BLOOD PRESSURE: 70 MMHG | OXYGEN SATURATION: 97 % | RESPIRATION RATE: 16 BRPM | WEIGHT: 149 LBS | HEIGHT: 64 IN | TEMPERATURE: 96.5 F

## 2025-08-05 DIAGNOSIS — F41.9 ANXIETY: ICD-10-CM

## 2025-08-05 DIAGNOSIS — R53.83 OTHER FATIGUE: ICD-10-CM

## 2025-08-05 DIAGNOSIS — R00.2 PALPITATIONS: Primary | ICD-10-CM

## 2025-08-05 DIAGNOSIS — G43.809 OTHER MIGRAINE WITHOUT STATUS MIGRAINOSUS, NOT INTRACTABLE: ICD-10-CM

## 2025-08-05 DIAGNOSIS — E16.2 HYPOGLYCEMIA: ICD-10-CM

## 2025-08-05 DIAGNOSIS — Z00.00 HEALTHCARE MAINTENANCE: ICD-10-CM

## 2025-08-05 DIAGNOSIS — K21.9 GASTROESOPHAGEAL REFLUX DISEASE, UNSPECIFIED WHETHER ESOPHAGITIS PRESENT: ICD-10-CM

## 2025-08-05 PROBLEM — G43.909 MIGRAINE: Status: ACTIVE | Noted: 2025-08-05

## 2025-08-05 PROCEDURE — 99204 OFFICE O/P NEW MOD 45 MIN: CPT

## 2025-08-05 PROCEDURE — 99395 PREV VISIT EST AGE 18-39: CPT

## 2025-08-05 RX ORDER — HYDROXYZINE HYDROCHLORIDE 25 MG/1
25 TABLET, FILM COATED ORAL EVERY 6 HOURS PRN
Qty: 30 TABLET | Refills: 1 | Status: SHIPPED | OUTPATIENT
Start: 2025-08-05

## 2025-08-06 PROBLEM — E16.2 HYPOGLYCEMIA: Status: ACTIVE | Noted: 2025-08-06

## 2025-08-06 PROBLEM — F41.9 ANXIETY: Status: ACTIVE | Noted: 2025-08-06

## 2025-08-12 ENCOUNTER — HOSPITAL ENCOUNTER (OUTPATIENT)
Dept: ULTRASOUND IMAGING | Facility: HOSPITAL | Age: 30
Discharge: HOME | End: 2025-08-12
Payer: COMMERCIAL